# Patient Record
Sex: FEMALE | Race: WHITE | NOT HISPANIC OR LATINO
[De-identification: names, ages, dates, MRNs, and addresses within clinical notes are randomized per-mention and may not be internally consistent; named-entity substitution may affect disease eponyms.]

---

## 2017-05-18 ENCOUNTER — APPOINTMENT (OUTPATIENT)
Dept: OBGYN | Facility: CLINIC | Age: 32
End: 2017-05-18

## 2017-05-21 LAB
C TRACH RRNA SPEC QL NAA+PROBE: NORMAL
HPV HIGH+LOW RISK DNA PNL CVX: NEGATIVE
N GONORRHOEA RRNA SPEC QL NAA+PROBE: NORMAL
SOURCE TP AMPLIFICATION: NORMAL

## 2017-05-23 LAB — CYTOLOGY CVX/VAG DOC THIN PREP: NORMAL

## 2018-09-04 ENCOUNTER — LABORATORY RESULT (OUTPATIENT)
Age: 33
End: 2018-09-04

## 2018-09-05 ENCOUNTER — APPOINTMENT (OUTPATIENT)
Dept: OBGYN | Facility: CLINIC | Age: 33
End: 2018-09-05
Payer: COMMERCIAL

## 2018-09-05 VITALS
DIASTOLIC BLOOD PRESSURE: 78 MMHG | BODY MASS INDEX: 20.62 KG/M2 | HEIGHT: 60 IN | SYSTOLIC BLOOD PRESSURE: 115 MMHG | WEIGHT: 105 LBS

## 2018-09-05 PROCEDURE — 36415 COLL VENOUS BLD VENIPUNCTURE: CPT

## 2018-09-05 PROCEDURE — 99395 PREV VISIT EST AGE 18-39: CPT

## 2018-09-07 LAB
C TRACH RRNA SPEC QL NAA+PROBE: NOT DETECTED
HPV HIGH+LOW RISK DNA PNL CVX: NOT DETECTED
N GONORRHOEA RRNA SPEC QL NAA+PROBE: NOT DETECTED
SOURCE TP AMPLIFICATION: NORMAL
ZIKA PCR SERUM: NOT DETECTED

## 2018-09-11 LAB — CYTOLOGY CVX/VAG DOC THIN PREP: NORMAL

## 2018-09-21 ENCOUNTER — TRANSCRIPTION ENCOUNTER (OUTPATIENT)
Age: 33
End: 2018-09-21

## 2018-09-24 ENCOUNTER — OTHER (OUTPATIENT)
Age: 33
End: 2018-09-24

## 2019-01-01 ENCOUNTER — LABORATORY RESULT (OUTPATIENT)
Age: 34
End: 2019-01-01

## 2019-08-01 ENCOUNTER — APPOINTMENT (OUTPATIENT)
Dept: OBGYN | Facility: CLINIC | Age: 34
End: 2019-08-01
Payer: COMMERCIAL

## 2019-08-01 VITALS
SYSTOLIC BLOOD PRESSURE: 132 MMHG | BODY MASS INDEX: 21.6 KG/M2 | HEIGHT: 60 IN | DIASTOLIC BLOOD PRESSURE: 81 MMHG | WEIGHT: 110 LBS

## 2019-08-01 PROCEDURE — 99214 OFFICE O/P EST MOD 30 MIN: CPT

## 2019-08-01 RX ORDER — NORETHINDRONE ACETATE 5 MG/1
5 TABLET ORAL DAILY
Qty: 40 | Refills: 0 | Status: COMPLETED | COMMUNITY
Start: 2017-12-04 | End: 2019-08-01

## 2019-08-01 RX ORDER — OSELTAMIVIR PHOSPHATE 75 MG/1
75 CAPSULE ORAL
Qty: 10 | Refills: 0 | Status: COMPLETED | COMMUNITY
Start: 2019-02-16

## 2019-08-01 RX ORDER — FLUCONAZOLE 150 MG/1
150 TABLET ORAL DAILY
Qty: 1 | Refills: 0 | Status: COMPLETED | COMMUNITY
Start: 2017-12-21 | End: 2019-08-01

## 2019-08-02 LAB
CANDIDA VAG CYTO: DETECTED
G VAGINALIS+PREV SP MTYP VAG QL MICRO: NOT DETECTED
T VAGINALIS VAG QL WET PREP: NOT DETECTED

## 2019-08-05 ENCOUNTER — APPOINTMENT (OUTPATIENT)
Dept: HUMAN REPRODUCTION | Facility: CLINIC | Age: 34
End: 2019-08-05
Payer: COMMERCIAL

## 2019-08-05 PROCEDURE — 99205 OFFICE O/P NEW HI 60 MIN: CPT | Mod: 25

## 2019-08-05 PROCEDURE — 36415 COLL VENOUS BLD VENIPUNCTURE: CPT

## 2019-08-05 PROCEDURE — 76830 TRANSVAGINAL US NON-OB: CPT

## 2019-08-08 ENCOUNTER — APPOINTMENT (OUTPATIENT)
Dept: HUMAN REPRODUCTION | Facility: CLINIC | Age: 34
End: 2019-08-08

## 2019-08-14 ENCOUNTER — APPOINTMENT (OUTPATIENT)
Dept: RADIOLOGY | Facility: HOSPITAL | Age: 34
End: 2019-08-14
Payer: COMMERCIAL

## 2019-08-14 ENCOUNTER — OUTPATIENT (OUTPATIENT)
Dept: OUTPATIENT SERVICES | Facility: HOSPITAL | Age: 34
LOS: 1 days | End: 2019-08-14
Payer: COMMERCIAL

## 2019-08-14 DIAGNOSIS — Z00.00 ENCOUNTER FOR GENERAL ADULT MEDICAL EXAMINATION WITHOUT ABNORMAL FINDINGS: ICD-10-CM

## 2019-08-14 DIAGNOSIS — N97.1 FEMALE INFERTILITY OF TUBAL ORIGIN: ICD-10-CM

## 2019-08-14 PROCEDURE — 58340 CATHETER FOR HYSTEROGRAPHY: CPT

## 2019-08-14 PROCEDURE — 74740 X-RAY FEMALE GENITAL TRACT: CPT

## 2019-08-14 PROCEDURE — 74740 X-RAY FEMALE GENITAL TRACT: CPT | Mod: 26

## 2019-08-16 ENCOUNTER — APPOINTMENT (OUTPATIENT)
Dept: HUMAN REPRODUCTION | Facility: CLINIC | Age: 34
End: 2019-08-16
Payer: COMMERCIAL

## 2019-08-16 PROCEDURE — 36415 COLL VENOUS BLD VENIPUNCTURE: CPT

## 2019-08-30 ENCOUNTER — APPOINTMENT (OUTPATIENT)
Dept: HUMAN REPRODUCTION | Facility: CLINIC | Age: 34
End: 2019-08-30
Payer: COMMERCIAL

## 2019-08-30 PROCEDURE — 99215 OFFICE O/P EST HI 40 MIN: CPT

## 2019-09-04 ENCOUNTER — OTHER (OUTPATIENT)
Age: 34
End: 2019-09-04

## 2019-09-05 ENCOUNTER — APPOINTMENT (OUTPATIENT)
Dept: MRI IMAGING | Facility: CLINIC | Age: 34
End: 2019-09-05

## 2019-09-09 ENCOUNTER — APPOINTMENT (OUTPATIENT)
Dept: ANTEPARTUM | Facility: CLINIC | Age: 34
End: 2019-09-09
Payer: COMMERCIAL

## 2019-09-09 ENCOUNTER — APPOINTMENT (OUTPATIENT)
Dept: MATERNAL FETAL MEDICINE | Facility: CLINIC | Age: 34
End: 2019-09-09
Payer: COMMERCIAL

## 2019-09-09 ENCOUNTER — ASOB RESULT (OUTPATIENT)
Age: 34
End: 2019-09-09

## 2019-09-09 VITALS
HEIGHT: 60 IN | DIASTOLIC BLOOD PRESSURE: 60 MMHG | HEART RATE: 98 BPM | BODY MASS INDEX: 21.6 KG/M2 | SYSTOLIC BLOOD PRESSURE: 122 MMHG | WEIGHT: 110 LBS

## 2019-09-09 DIAGNOSIS — O09.299 SUPERVISION OF PREGNANCY WITH OTHER POOR REPRODUCTIVE OR OBSTETRIC HISTORY, UNSPECIFIED TRIMESTER: ICD-10-CM

## 2019-09-09 DIAGNOSIS — Z01.419 ENCOUNTER FOR GYNECOLOGICAL EXAMINATION (GENERAL) (ROUTINE) W/OUT ABNORMAL FINDINGS: ICD-10-CM

## 2019-09-09 DIAGNOSIS — O09.291 SUPERVISION OF PREGNANCY WITH OTHER POOR REPRODUCTIVE OR OBSTETRIC HISTORY, FIRST TRIMESTER: ICD-10-CM

## 2019-09-09 DIAGNOSIS — B37.3 CANDIDIASIS OF VULVA AND VAGINA: ICD-10-CM

## 2019-09-09 DIAGNOSIS — Z98.891 HISTORY OF UTERINE SCAR FROM PREVIOUS SURGERY: ICD-10-CM

## 2019-09-09 DIAGNOSIS — Z87.51 PERSONAL HISTORY OF PRE-TERM LABOR: ICD-10-CM

## 2019-09-09 DIAGNOSIS — Z78.9 OTHER SPECIFIED HEALTH STATUS: ICD-10-CM

## 2019-09-09 DIAGNOSIS — Z87.59 PERSONAL HISTORY OF OTHER COMPLICATIONS OF PREGNANCY, CHILDBIRTH AND THE PUERPERIUM: ICD-10-CM

## 2019-09-09 DIAGNOSIS — Z87.42 PERSONAL HISTORY OF OTHER DISEASES OF THE FEMALE GENITAL TRACT: ICD-10-CM

## 2019-09-09 DIAGNOSIS — O26.20 PREGNANCY CARE FOR PATIENT WITH RECURRENT PREGNANCY LOSS, UNSPECIFIED TRIMESTER: ICD-10-CM

## 2019-09-09 DIAGNOSIS — Z31.69 ENCOUNTER FOR OTHER GENERAL COUNSELING AND ADVICE ON PROCREATION: ICD-10-CM

## 2019-09-09 PROCEDURE — 76817 TRANSVAGINAL US OBSTETRIC: CPT

## 2019-09-09 PROCEDURE — 99205 OFFICE O/P NEW HI 60 MIN: CPT | Mod: 25,TH

## 2019-09-09 RX ORDER — FLUCONAZOLE 150 MG/1
150 TABLET ORAL
Qty: 1 | Refills: 1 | Status: DISCONTINUED | COMMUNITY
Start: 2019-08-02 | End: 2019-09-09

## 2019-09-09 RX ORDER — TERCONAZOLE 8 MG/G
0.8 CREAM VAGINAL
Qty: 1 | Refills: 0 | Status: DISCONTINUED | COMMUNITY
Start: 2019-08-05 | End: 2019-09-09

## 2019-09-09 RX ORDER — MULTIVIT-MIN/FOLIC/VIT K/LYCOP 400-300MCG
28-0.8 TABLET ORAL DAILY
Qty: 30 | Refills: 3 | Status: ACTIVE | COMMUNITY
Start: 2019-09-09

## 2019-09-09 RX ORDER — DOXYCYCLINE HYCLATE 100 MG/1
100 TABLET ORAL
Qty: 8 | Refills: 0 | Status: DISCONTINUED | COMMUNITY
Start: 2019-08-05 | End: 2019-09-09

## 2019-09-09 NOTE — DISCUSSION/SUMMARY
[FreeTextEntry1] : Alex is a 33-year-old  being seen at approximately 5 weeks for previous history of incompetent cervix with  loss. Her obstetrical history is significant for 2 first trimester miscarriages one at 8 weeks one at 12 weeks both requiring D&C. Subsequent pregnancy in  an incompetent cervix was diagnosed at approximately 20 weeks. Rescue cerclage was placed and the patient was put on bed rest. Approximately 2 weeks later patient had cramps and abdominal pain and was evaluated. Spontaneous rupture membranes occurred and subsequently Alex delivered vaginally without problems or complications. Pathology was consistent with chorioamnionitis. Of note the patient was told that she had a septum after the last pregnancy loss.  An operative hysteroscopy has been performed to remove the septum. She is being followed by Dr. Verdugo and a saline hysterogram was performed recently and a possible uterine septum was again noted.\par \par A first trimester ultrasound was performed today which does reveal a single intrauterine gestational sac with probably yolk sac seen. Fetal pole and fetal cardiac activity was not noted on today's study. Size is consistent with dates. Vital signs today reveal a blood pressure of 122/60 and maternal weight is 110 pounds which is a 0 pound weight gain from the evaluation done on . This is consistent with a BMI of 21.48KG.\par \par Management of the pregnancy was discussed. The patient was advised that after NIPS blood work and ultrascreen evaluation performed and found to be within normal limits a prophylactic cerclage should be placed between 13 and 14 weeks. In addition at the time of the next ultrasound we will attempt to evaluate if a uterine septum is present and how it may impact this pregnancy. After normal genetic testing and the cerclages placed between 13 and 14 weeks a cervical length evaluations will be done at 16 weeks, 20 weeks and 23 weeks. No other changes in her current medical management are indicated. As it pertains to her advanced maternal age genetic counseling will be performed and this will be scheduled after the next ultrasound. All of the above was discussed with the patient and her  and all their questions were answered.\par \par Her family and medical histories are noncontributory. Her surgical history is significant for 2 D&Cs, operative hysteroscopy and a cervical cerclage. She has no known allergies to medications and denies alcohol, tobacco or drug use.\par \par \par \par Recommendations;\par \par #1. Followup ultrasound in 2 weeks for viability and dating.\par #2. Genetic counseling with NIPS blood work to be performed.\par #3. Ultrascreen evaluation in between 11 and 12 weeks.\par #4. Cervical cerclage placed prophylactically between 13 and 14 week should all genetic testing demonstrate low risk for fetal aneuploidy.\par #5. Followup maternal fetal medicine consultation after cerclage placed.\par \par 60 minutes of face to face time for this consultation.

## 2019-09-23 ENCOUNTER — ASOB RESULT (OUTPATIENT)
Age: 34
End: 2019-09-23

## 2019-09-23 ENCOUNTER — APPOINTMENT (OUTPATIENT)
Dept: ANTEPARTUM | Facility: CLINIC | Age: 34
End: 2019-09-23
Payer: COMMERCIAL

## 2019-09-23 PROCEDURE — 76817 TRANSVAGINAL US OBSTETRIC: CPT

## 2019-09-26 ENCOUNTER — APPOINTMENT (OUTPATIENT)
Dept: OBGYN | Facility: CLINIC | Age: 34
End: 2019-09-26
Payer: COMMERCIAL

## 2019-09-26 VITALS
BODY MASS INDEX: 21.6 KG/M2 | DIASTOLIC BLOOD PRESSURE: 78 MMHG | HEIGHT: 60 IN | SYSTOLIC BLOOD PRESSURE: 119 MMHG | WEIGHT: 110 LBS

## 2019-09-26 DIAGNOSIS — Z00.00 ENCOUNTER FOR GENERAL ADULT MEDICAL EXAMINATION W/OUT ABNORMAL FINDINGS: ICD-10-CM

## 2019-09-26 PROCEDURE — 99395 PREV VISIT EST AGE 18-39: CPT

## 2019-09-26 PROCEDURE — 36415 COLL VENOUS BLD VENIPUNCTURE: CPT

## 2019-09-26 NOTE — HISTORY OF PRESENT ILLNESS
[1 Year Ago] : 1 year ago [Good] : being in good health [Healthy Diet] : a healthy diet [Regular Exercise] : regular exercise [Up to Date] : up to date with ~his/her~ STD screening [Sexually Active] : is sexually active [Weight Concerns] : no concerns with her weight [Menstrual Problems] : reports normal menses [Fever] : no fever [Nausea] : no nausea [Vomiting] : no vomiting [Diarrhea] : no diarrhea [Vaginal Bleeding] : no vaginal bleeding [Pelvic Pressure] : no pelvic pressure [Dysuria] : no dysuria

## 2019-09-26 NOTE — REVIEW OF SYSTEMS
----- Message from Loida Sinhg sent at 4/9/2019  7:59 AM CDT -----  Contact: SABAS  Pt was a no show for her MFM appt.    [Nl] : Integumentary

## 2019-09-27 LAB
ABO + RH PNL BLD: NORMAL
ALBUMIN SERPL ELPH-MCNC: 4.7 G/DL
ALP BLD-CCNC: 55 U/L
ALT SERPL-CCNC: 14 U/L
ANION GAP SERPL CALC-SCNC: 13 MMOL/L
AST SERPL-CCNC: 13 U/L
BASOPHILS # BLD AUTO: 0.02 K/UL
BASOPHILS NFR BLD AUTO: 0.2 %
BILIRUB SERPL-MCNC: 0.2 MG/DL
BLD GP AB SCN SERPL QL: NORMAL
BUN SERPL-MCNC: 11 MG/DL
CALCIUM SERPL-MCNC: 9.4 MG/DL
CHLORIDE SERPL-SCNC: 100 MMOL/L
CO2 SERPL-SCNC: 24 MMOL/L
CREAT SERPL-MCNC: 0.49 MG/DL
EOSINOPHIL # BLD AUTO: 0.19 K/UL
EOSINOPHIL NFR BLD AUTO: 2.2 %
GLUCOSE SERPL-MCNC: 117 MG/DL
HBV SURFACE AG SER QL: NONREACTIVE
HCT VFR BLD CALC: 40.5 %
HCV AB SER QL: NONREACTIVE
HCV S/CO RATIO: 0.15 S/CO
HGB BLD-MCNC: 13 G/DL
HIV1+2 AB SPEC QL IA.RAPID: NONREACTIVE
HPV HIGH+LOW RISK DNA PNL CVX: NOT DETECTED
IMM GRANULOCYTES NFR BLD AUTO: 0.2 %
LYMPHOCYTES # BLD AUTO: 1.85 K/UL
LYMPHOCYTES NFR BLD AUTO: 21.3 %
MAN DIFF?: NORMAL
MCHC RBC-ENTMCNC: 29.5 PG
MCHC RBC-ENTMCNC: 32.1 GM/DL
MCV RBC AUTO: 92 FL
MEV IGG FLD QL IA: >300 AU/ML
MEV IGG+IGM SER-IMP: POSITIVE
MONOCYTES # BLD AUTO: 0.81 K/UL
MONOCYTES NFR BLD AUTO: 9.3 %
NEUTROPHILS # BLD AUTO: 5.79 K/UL
NEUTROPHILS NFR BLD AUTO: 66.8 %
PLATELET # BLD AUTO: 199 K/UL
POTASSIUM SERPL-SCNC: 3.6 MMOL/L
PROT SERPL-MCNC: 7.1 G/DL
RBC # BLD: 4.4 M/UL
RBC # FLD: 12.2 %
RUBV IGG FLD-ACNC: 3.6 INDEX
RUBV IGG SER-IMP: POSITIVE
SODIUM SERPL-SCNC: 137 MMOL/L
T PALLIDUM AB SER QL IA: NEGATIVE
TSH SERPL-ACNC: 1.52 UIU/ML
VZV AB TITR SER: POSITIVE
VZV IGG SER IF-ACNC: 1378 INDEX
WBC # FLD AUTO: 8.68 K/UL

## 2019-09-29 LAB
LEAD BLD-MCNC: <1 UG/DL
MEV IGM SER QL: NEGATIVE

## 2019-10-02 LAB
B19V IGG SER QL IA: 5.6 INDEX
B19V IGG+IGM SER-IMP: NORMAL
B19V IGG+IGM SER-IMP: POSITIVE
B19V IGM FLD-ACNC: 0.1
B19V IGM SER-ACNC: NEGATIVE
CYTOLOGY CVX/VAG DOC THIN PREP: NORMAL
FMR1 GENE MUT ANL BLD/T: NORMAL
HGB A MFR BLD: 97.4 %
HGB A2 MFR BLD: 2.6 %
HGB FRACT BLD-IMP: NORMAL

## 2019-10-04 LAB
AR GENE MUT ANL BLD/T: NEGATIVE
CFTR MUT TESTED BLD/T: NEGATIVE

## 2019-10-08 ENCOUNTER — APPOINTMENT (OUTPATIENT)
Dept: OBGYN | Facility: CLINIC | Age: 34
End: 2019-10-08

## 2019-10-08 ENCOUNTER — OTHER (OUTPATIENT)
Age: 34
End: 2019-10-08

## 2019-10-11 ENCOUNTER — APPOINTMENT (OUTPATIENT)
Dept: OBGYN | Facility: CLINIC | Age: 34
End: 2019-10-11
Payer: COMMERCIAL

## 2019-10-11 PROCEDURE — 36415 COLL VENOUS BLD VENIPUNCTURE: CPT

## 2019-10-21 ENCOUNTER — APPOINTMENT (OUTPATIENT)
Dept: MATERNAL FETAL MEDICINE | Facility: CLINIC | Age: 34
End: 2019-10-21
Payer: COMMERCIAL

## 2019-10-21 PROCEDURE — ZZZZZ: CPT

## 2019-10-21 PROCEDURE — 99213 OFFICE O/P EST LOW 20 MIN: CPT

## 2019-10-29 ENCOUNTER — APPOINTMENT (OUTPATIENT)
Dept: MATERNAL FETAL MEDICINE | Facility: CLINIC | Age: 34
End: 2019-10-29

## 2019-10-29 ENCOUNTER — APPOINTMENT (OUTPATIENT)
Dept: ANTEPARTUM | Facility: CLINIC | Age: 34
End: 2019-10-29

## 2019-10-30 ENCOUNTER — APPOINTMENT (OUTPATIENT)
Dept: OBGYN | Facility: CLINIC | Age: 34
End: 2019-10-30
Payer: COMMERCIAL

## 2019-10-30 VITALS — WEIGHT: 116 LBS | BODY MASS INDEX: 22.66 KG/M2 | SYSTOLIC BLOOD PRESSURE: 121 MMHG | DIASTOLIC BLOOD PRESSURE: 80 MMHG

## 2019-10-30 PROCEDURE — 0502F SUBSEQUENT PRENATAL CARE: CPT

## 2019-10-31 ENCOUNTER — NON-APPOINTMENT (OUTPATIENT)
Age: 34
End: 2019-10-31

## 2019-11-02 ENCOUNTER — APPOINTMENT (OUTPATIENT)
Dept: ANTEPARTUM | Facility: CLINIC | Age: 34
End: 2019-11-02
Payer: COMMERCIAL

## 2019-11-02 PROCEDURE — 76813 OB US NUCHAL MEAS 1 GEST: CPT

## 2019-11-02 PROCEDURE — 76801 OB US < 14 WKS SINGLE FETUS: CPT

## 2019-11-06 ENCOUNTER — OUTPATIENT (OUTPATIENT)
Dept: OUTPATIENT SERVICES | Facility: HOSPITAL | Age: 34
LOS: 1 days | End: 2019-11-06

## 2019-11-06 VITALS
WEIGHT: 115.96 LBS | SYSTOLIC BLOOD PRESSURE: 100 MMHG | RESPIRATION RATE: 16 BRPM | OXYGEN SATURATION: 99 % | TEMPERATURE: 99 F | HEART RATE: 98 BPM | DIASTOLIC BLOOD PRESSURE: 70 MMHG | HEIGHT: 60 IN

## 2019-11-06 DIAGNOSIS — Z98.890 OTHER SPECIFIED POSTPROCEDURAL STATES: Chronic | ICD-10-CM

## 2019-11-06 DIAGNOSIS — O34.30 MATERNAL CARE FOR CERVICAL INCOMPETENCE, UNSPECIFIED TRIMESTER: ICD-10-CM

## 2019-11-06 PROBLEM — Z00.00 ENCOUNTER FOR PREVENTIVE HEALTH EXAMINATION: Noted: 2019-11-06

## 2019-11-06 LAB
BLD GP AB SCN SERPL QL: NEGATIVE — SIGNIFICANT CHANGE UP
HCT VFR BLD CALC: 38.8 % — SIGNIFICANT CHANGE UP (ref 34.5–45)
HGB BLD-MCNC: 12.4 G/DL — SIGNIFICANT CHANGE UP (ref 11.5–15.5)
MCHC RBC-ENTMCNC: 29.8 PG — SIGNIFICANT CHANGE UP (ref 27–34)
MCHC RBC-ENTMCNC: 32 % — SIGNIFICANT CHANGE UP (ref 32–36)
MCV RBC AUTO: 93.3 FL — SIGNIFICANT CHANGE UP (ref 80–100)
NRBC # FLD: 0 K/UL — SIGNIFICANT CHANGE UP (ref 0–0)
PLATELET # BLD AUTO: 167 K/UL — SIGNIFICANT CHANGE UP (ref 150–400)
PMV BLD: 10 FL — SIGNIFICANT CHANGE UP (ref 7–13)
RBC # BLD: 4.16 M/UL — SIGNIFICANT CHANGE UP (ref 3.8–5.2)
RBC # FLD: 12.5 % — SIGNIFICANT CHANGE UP (ref 10.3–14.5)
RH IG SCN BLD-IMP: POSITIVE — SIGNIFICANT CHANGE UP
WBC # BLD: 8.12 K/UL — SIGNIFICANT CHANGE UP (ref 3.8–10.5)
WBC # FLD AUTO: 8.12 K/UL — SIGNIFICANT CHANGE UP (ref 3.8–10.5)

## 2019-11-06 NOTE — H&P PST ADULT - NSICDXPASTSURGICALHX_GEN_ALL_CORE_FT
PAST SURGICAL HISTORY:  History of cervical cerclage done emergently 2014    S/P dilation and curettage x2

## 2019-11-06 NOTE — H&P PST ADULT - HISTORY OF PRESENT ILLNESS
34 year old female, 13 weeks pregnant with hx of 3 miscarriages, last on in 2014 at 22 weeks gestation. Pt presents today for presurgical evaluation for 34 year old female, 13 weeks pregnant with hx of 3 miscarriages, last on in 2014 at 22 weeks gestation. Pt presents today for presurgical evaluation for Cerclage Insertion scheduled on 11/11/19.

## 2019-11-06 NOTE — H&P PST ADULT - NSICDXPROBLEM_GEN_ALL_CORE_FT
PROBLEM DIAGNOSES  Problem: Maternal care for cervical incompetence  Assessment and Plan: Pt scheduled for surgery on 11/11/19.  Pre-op instructions provided. Pt verbalized understanding.

## 2019-11-06 NOTE — H&P PST ADULT - MUSCULOSKELETAL
details… detailed exam no joint swelling/ROM intact/no joint erythema/normal strength/no joint warmth/no calf tenderness

## 2019-11-06 NOTE — H&P PST ADULT - NS MD HP INPLANTS MED DEV
CHIEF COMPLAINT: This is a 36-year-old female here for preventive health exam.    SUBJECTIVE: Patient's doing well without complaints.  She has borderline SAMIRA and has not been using CPAP.  Patient admits that she does not sleep more than 4-6 hours a night, however.  She requests refill of Ambien.  She is working full-time and obtaining her Master's degree online.  She's gained 8 pounds in the last year.  Her menstrual cycles are regular, often with heavy flow and dysmenorrhea.  Patient has uterine fibroid.  She takes pseudoephedrine and Zyrtec for allergic rhinitis.     Eye exam 2016. Pap smear February 2017, due again in February 2020. Tdap September 2012. Flu vaccine October 2016.     ROS:  GENERAL: Patient denies fever, chills, night sweats. Patient denies weight gain. Patient denies anorexia, fatigue, weakness or swollen glands.  SKIN: Patient denies rash or hair loss.  HEENT: Patient denies sore throat, ear pain, hearing loss, nasal congestion, or runny nose. Patient denies visual disturbance, eye irritation or discharge.  LUNGS: Patient denies cough, wheeze or hemoptysis.  CARDIOVASCULAR: Patient denies chest pain, shortness of breath, palpitations, syncope or lower extremity edema.  GI: Patient denies abdominal pain, nausea, vomiting, diarrhea, constipation, blood in stool or melena.  GENITOURINARY: Patient denies pelvic pain, vaginal discharge, itch or odor. Patient denies irregular vaginal bleeding. Patient denies dysuria, frequency, hematuria, nocturia, urgency or incontinence.  BREASTS: Patient denies breast pain, mass or nipple discharge.  MUSCULOSKELETAL: Patient denies joint pain, swelling, redness or warmth.  NEUROLOGIC: Patient denies headache, vertigo, paresthesias, weakness in limb, dysarthria, dysphagia or abnormality of gait.  PSYCHIATRIC: Patient denies anxiety, depression, or memory loss. Positive for insomnia.     OBJECTIVE:   GENERAL: Well-developed well-nourished black female alert and  oriented x3, in no acute distress. Memory, judgment and cognition without deficit. Weight loss of 17 pounds in the last year.  SKIN: Clear without rash. Normal color and tone.  HEENT: Eyes: Clear conjunctivae. Pupils equal reactive to light and accommodation. Ears: Clear TMs. Clear canals. Nose: Without congestion. Pharynx: Without injection or exudates.  NECK: Supple, normal range of motion. No masses, lymphadenopathy or enlarged thyroid. No JVD. Carotids 2+ and equal. No bruits.  LUNGS: Clear to auscultation. Normal respiratory effort.  CARDIOVASCULAR: Regular rhythm, normal S1, S2 without murmur, gallop or rub.  BACK: No CVA or spinal tenderness.  BREASTS: No masses, tenderness or nipple discharge.  ABDOMEN: Normal appearance. Active bowel sounds. Soft, nontender without mass or organomegaly. No rebound or guarding.  EXTREMITIES: Without cyanosis, clubbing or edema. Distal pulses 2+ and equal. Normal range of motion in all extremities. No joint effusion, erythema or warmth.  NEUROLOGIC: Cranial nerves II through XII without deficit. Motor strength equal bilaterally. Sensation normal to touch. Deep tendon reflexes 2+ and equal. Gait without abnormality. No tremor. Negative cerebellar signs.   PELVIC: External: Without lesions or inflammation.  Vaginal: Clear.  Cervix: Small nabothian cyst, nontender.  Pap X2.  Uterus: Enlarged and retroflexed.  Adnexa: Non-tender, without masses.      ASSESSMENT:  1. Preventative health care    2. Abnormal Papanicolaou smear of cervix with positive human papilloma virus (HPV) test    3. Chronic allergic rhinitis, unspecified seasonality, unspecified trigger    4. Primary insomnia      PLAN:   1.  Exercise regularly.  2.  Age-appropriate counseling.  3.  Fasting lab.  4.  Patient declines influenza vaccine.  5.  Refill Ambien.  6.  Follow-up annually.   None

## 2019-11-07 ENCOUNTER — APPOINTMENT (OUTPATIENT)
Dept: ANTEPARTUM | Facility: CLINIC | Age: 34
End: 2019-11-07
Payer: COMMERCIAL

## 2019-11-07 PROCEDURE — 76817 TRANSVAGINAL US OBSTETRIC: CPT

## 2019-11-09 ENCOUNTER — APPOINTMENT (OUTPATIENT)
Dept: ANTEPARTUM | Facility: CLINIC | Age: 34
End: 2019-11-09
Payer: COMMERCIAL

## 2019-11-09 PROCEDURE — 76817 TRANSVAGINAL US OBSTETRIC: CPT

## 2019-11-10 ENCOUNTER — TRANSCRIPTION ENCOUNTER (OUTPATIENT)
Age: 34
End: 2019-11-10

## 2019-11-11 ENCOUNTER — OUTPATIENT (OUTPATIENT)
Dept: OUTPATIENT SERVICES | Facility: HOSPITAL | Age: 34
LOS: 1 days | Discharge: HOME | End: 2019-11-11

## 2019-11-11 ENCOUNTER — TRANSCRIPTION ENCOUNTER (OUTPATIENT)
Age: 34
End: 2019-11-11

## 2019-11-11 VITALS — DIASTOLIC BLOOD PRESSURE: 74 MMHG | SYSTOLIC BLOOD PRESSURE: 110 MMHG | HEART RATE: 100 BPM

## 2019-11-11 VITALS
HEART RATE: 97 BPM | RESPIRATION RATE: 17 BRPM | OXYGEN SATURATION: 99 % | DIASTOLIC BLOOD PRESSURE: 84 MMHG | SYSTOLIC BLOOD PRESSURE: 105 MMHG

## 2019-11-11 DIAGNOSIS — Z98.890 OTHER SPECIFIED POSTPROCEDURAL STATES: Chronic | ICD-10-CM

## 2019-11-11 DIAGNOSIS — O34.30 MATERNAL CARE FOR CERVICAL INCOMPETENCE, UNSPECIFIED TRIMESTER: ICD-10-CM

## 2019-11-11 LAB
BLD GP AB SCN SERPL QL: NEGATIVE — SIGNIFICANT CHANGE UP
RH IG SCN BLD-IMP: POSITIVE — SIGNIFICANT CHANGE UP

## 2019-11-11 RX ORDER — INDOMETHACIN 50 MG
1 CAPSULE ORAL
Qty: 8 | Refills: 0
Start: 2019-11-11 | End: 2019-11-12

## 2019-11-11 RX ORDER — ACETAMINOPHEN 500 MG
1000 TABLET ORAL ONCE
Refills: 0 | Status: COMPLETED | OUTPATIENT
Start: 2019-11-11 | End: 2019-11-11

## 2019-11-11 RX ORDER — SODIUM CHLORIDE 9 MG/ML
1000 INJECTION, SOLUTION INTRAVENOUS ONCE
Refills: 0 | Status: COMPLETED | OUTPATIENT
Start: 2019-11-11 | End: 2019-11-11

## 2019-11-11 RX ORDER — SODIUM CHLORIDE 9 MG/ML
1000 INJECTION, SOLUTION INTRAVENOUS
Refills: 0 | Status: DISCONTINUED | OUTPATIENT
Start: 2019-11-11 | End: 2019-11-11

## 2019-11-11 RX ORDER — METOCLOPRAMIDE HCL 10 MG
10 TABLET ORAL ONCE
Refills: 0 | Status: COMPLETED | OUTPATIENT
Start: 2019-11-11 | End: 2019-11-11

## 2019-11-11 RX ORDER — INDOMETHACIN 50 MG
25 CAPSULE ORAL EVERY 6 HOURS
Refills: 0 | Status: DISCONTINUED | OUTPATIENT
Start: 2019-11-11 | End: 2019-11-11

## 2019-11-11 RX ORDER — CITRIC ACID/SODIUM CITRATE 300-500 MG
30 SOLUTION, ORAL ORAL ONCE
Refills: 0 | Status: COMPLETED | OUTPATIENT
Start: 2019-11-11 | End: 2019-11-11

## 2019-11-11 RX ORDER — FAMOTIDINE 10 MG/ML
20 INJECTION INTRAVENOUS ONCE
Refills: 0 | Status: COMPLETED | OUTPATIENT
Start: 2019-11-11 | End: 2019-11-11

## 2019-11-11 RX ORDER — INDOMETHACIN 50 MG
50 CAPSULE ORAL ONCE
Refills: 0 | Status: COMPLETED | OUTPATIENT
Start: 2019-11-11 | End: 2019-11-11

## 2019-11-11 RX ADMIN — SODIUM CHLORIDE 125 MILLILITER(S): 9 INJECTION, SOLUTION INTRAVENOUS at 13:32

## 2019-11-11 RX ADMIN — SODIUM CHLORIDE 2000 MILLILITER(S): 9 INJECTION, SOLUTION INTRAVENOUS at 07:13

## 2019-11-11 RX ADMIN — FAMOTIDINE 20 MILLIGRAM(S): 10 INJECTION INTRAVENOUS at 07:12

## 2019-11-11 RX ADMIN — Medication 50 MILLIGRAM(S): at 07:18

## 2019-11-11 RX ADMIN — Medication 30 MILLILITER(S): at 07:13

## 2019-11-11 RX ADMIN — SODIUM CHLORIDE 200 MILLILITER(S): 9 INJECTION, SOLUTION INTRAVENOUS at 07:13

## 2019-11-11 RX ADMIN — Medication 25 MILLIGRAM(S): at 13:29

## 2019-11-11 RX ADMIN — Medication 400 MILLIGRAM(S): at 08:51

## 2019-11-11 RX ADMIN — Medication 10 MILLIGRAM(S): at 07:12

## 2019-11-11 NOTE — OB PROVIDER H&P - NSHPPHYSICALEXAM_GEN_ALL_CORE
Gen: wdwn female, A&Ox3, NAD,   Chest: s1s2 + RRR, no w/r/r  abd: gravid,  ext: no edema noted b/l lower extremities    5'0"  115lbs/52kg  BMI : 22.5 Gen: wdwn female, A&Ox3, NAD,   Chest: s1s2 + RRR, no w/r/r  abd: gravid,  ext: no edema noted b/l lower extremities    Sono: confirmed fetal heart check performed this am by myself  placenta anterior and not covering cervix    5'0"  115lbs/52kg  BMI : 22.5

## 2019-11-11 NOTE — DISCHARGE NOTE ANTEPARTUM - HOSPITAL COURSE
Patient admitted at 13w5d admitted for placement of Rain cerclage for cervical insufficiency. Cerclage placed without complications and FH confirmed postoperatively.

## 2019-11-11 NOTE — DISCHARGE NOTE ANTEPARTUM - CARE PLAN
Principal Discharge DX:	Cervical insufficiency during pregnancy, antepartum  Goal:	return to prenatal course of care  Assessment and plan of treatment:	-Continue with indomethacin as prescribed for 48h   -Call your OBGYN w/ any vaginal bleeding, contractions, leakage of fluid.   -Follow up with your OBGYN as scheduled

## 2019-11-11 NOTE — OB PROVIDER H&P - NS_OBGYNHISTORY_OBGYN_ALL_OB_FT
2013 @22w vaginal delivery 2/2 failed cerclage. At 20w, pt was dilated and emerg. cerclage placed, failed at 22w.  Vaginal delivery of female.   both <12w D&C    gyn: denies abn pap/std/hpv    GBS: unknown at this time

## 2019-11-11 NOTE — DISCHARGE NOTE ANTEPARTUM - PLAN OF CARE
return to prenatal course of care -Continue with indomethacin as prescribed for 48h   -Call your OBGYN w/ any vaginal bleeding, contractions, leakage of fluid.   -Follow up with your OBGYN as scheduled

## 2019-11-11 NOTE — BRIEF OPERATIVE NOTE - NSICDXBRIEFPOSTOP_GEN_ALL_CORE_FT
POST-OP DIAGNOSIS:  Cervical insufficiency in pregnancy, antepartum 11-Nov-2019 11:50:20  Heidy John

## 2019-11-11 NOTE — OB PROVIDER H&P - NSHPLABSRESULTS_GEN_ALL_CORE
Type + Screen (11.06.19 @ 18:46)    ABO Interpretation: B    Rh Interpretation: Positive    Antibody Screen: Negative    Complete Blood Count (11.06.19 @ 19:00)    WBC Count: 8.12 K/uL    Hemoglobin: 12.4 g/dL    Hematocrit: 38.8 %    Platelet Count - Automated: 167 K/uL

## 2019-11-11 NOTE — BRIEF OPERATIVE NOTE - OPERATION/FINDINGS
Rain cerclage placed with TVUS demonstrating closed cervix and abd sono demonstrating FHR 158bpm postprocedure.

## 2019-11-11 NOTE — DISCHARGE NOTE ANTEPARTUM - PATIENT PORTAL LINK FT
You can access the FollowMyHealth Patient Portal offered by [facility] by registering at the following website: [email or web address]. By joining [email or web address]’s FollowMyHealth portal, you will also be able to view your health information using other applications (apps) compatible with our system.

## 2019-11-11 NOTE — DISCHARGE NOTE ANTEPARTUM - MEDICATION SUMMARY - MEDICATIONS TO TAKE
I will START or STAY ON the medications listed below when I get home from the hospital:    indomethacin 25 mg oral capsule  -- 1 cap(s) by mouth every 6 hours MDD:4 for 48 hours  -- Do not take aspirin or aspirin containing products without knowledge and consent of your physician.  It is very important that you take or use this exactly as directed.  Do not skip doses or discontinue unless directed by your doctor.  May cause drowsiness or dizziness.  Obtain medical advice before taking any non-prescription drugs as some may affect the action of this medication.  Take with food or milk.    -- Indication: For s/p cerclage    Prenatal 1 oral capsule  -- 1 cap(s) by mouth once a day  -- Indication: For prenatal care

## 2019-11-11 NOTE — OB PROVIDER H&P - HISTORY OF PRESENT ILLNESS
This 33 yo  female  at 13w5d presents for cerclage placement.  Pt denies any vaginal bleeding or LOF.  Dr. Jessica Llanes is her primary OB and pt has been seeing  since beginning of pregnancy.

## 2019-11-11 NOTE — OB PROVIDER H&P - ASSESSMENT
33 yo  Female  at 13w5d presents for scheduled cerclage placement    Admit to L&D  Routine labs/nst/ivf @200cc/hr  Pepcid, Reglan, Bicitra  prenatal record summary available  prenatal work sheet reviewed  sono to confirm fetal heart  pt willing to accept blood if needed, consents obtained  Dr. Ibrahim informed  Anesthesia consult for pain management    [facility], PAC  #[id number]

## 2019-11-11 NOTE — BRIEF OPERATIVE NOTE - NSICDXBRIEFPROCEDURE_GEN_ALL_CORE_FT
PROCEDURES:  Koffi cerclage of cervix during pregnancy by vaginal approach 11-Nov-2019 11:49:54  Heidy John

## 2019-11-16 NOTE — LETTER GREETING
Error     [I had the pleasure of seeing your patient today.] : I had the pleasure of seeing your patient today [Dear  ___] : Dear  [unfilled],

## 2019-11-18 ENCOUNTER — APPOINTMENT (OUTPATIENT)
Dept: ANTEPARTUM | Facility: CLINIC | Age: 34
End: 2019-11-18
Payer: COMMERCIAL

## 2019-11-18 PROCEDURE — 76817 TRANSVAGINAL US OBSTETRIC: CPT

## 2019-11-18 PROCEDURE — 76815 OB US LIMITED FETUS(S): CPT

## 2019-11-26 ENCOUNTER — NON-APPOINTMENT (OUTPATIENT)
Age: 34
End: 2019-11-26

## 2019-11-26 ENCOUNTER — APPOINTMENT (OUTPATIENT)
Dept: OBGYN | Facility: CLINIC | Age: 34
End: 2019-11-26
Payer: COMMERCIAL

## 2019-11-26 VITALS
HEIGHT: 60 IN | SYSTOLIC BLOOD PRESSURE: 130 MMHG | WEIGHT: 120 LBS | DIASTOLIC BLOOD PRESSURE: 84 MMHG | BODY MASS INDEX: 23.56 KG/M2

## 2019-11-26 PROCEDURE — 0502F SUBSEQUENT PRENATAL CARE: CPT

## 2019-12-02 ENCOUNTER — APPOINTMENT (OUTPATIENT)
Dept: ANTEPARTUM | Facility: CLINIC | Age: 34
End: 2019-12-02
Payer: COMMERCIAL

## 2019-12-02 PROCEDURE — 76817 TRANSVAGINAL US OBSTETRIC: CPT

## 2019-12-10 RX ORDER — HYDROXYPROGESTERONE CAPROATE 250 MG/ML
250 INJECTION INTRAMUSCULAR
Qty: 1 | Refills: 5 | Status: ACTIVE | COMMUNITY
Start: 2019-12-10

## 2019-12-12 ENCOUNTER — APPOINTMENT (OUTPATIENT)
Dept: OBGYN | Facility: CLINIC | Age: 34
End: 2019-12-12
Payer: COMMERCIAL

## 2019-12-12 PROCEDURE — 96372 THER/PROPH/DIAG INJ SC/IM: CPT

## 2019-12-12 RX ORDER — HYDROXYPROGESTERONE CAPROATE 250 MG/ML
250 INJECTION INTRAMUSCULAR
Qty: 1 | Refills: 0 | Status: COMPLETED | OUTPATIENT
Start: 2019-12-12

## 2019-12-16 ENCOUNTER — APPOINTMENT (OUTPATIENT)
Dept: ANTEPARTUM | Facility: CLINIC | Age: 34
End: 2019-12-16
Payer: COMMERCIAL

## 2019-12-16 PROCEDURE — 76817 TRANSVAGINAL US OBSTETRIC: CPT

## 2019-12-19 ENCOUNTER — APPOINTMENT (OUTPATIENT)
Dept: OBGYN | Facility: CLINIC | Age: 34
End: 2019-12-19
Payer: COMMERCIAL

## 2019-12-19 PROCEDURE — 96372 THER/PROPH/DIAG INJ SC/IM: CPT

## 2019-12-19 RX ORDER — HYDROXYPROGESTERONE CAPROATE 250 MG/ML
250 INJECTION INTRAMUSCULAR
Qty: 1 | Refills: 0 | Status: COMPLETED | OUTPATIENT
Start: 2019-12-19

## 2019-12-26 ENCOUNTER — APPOINTMENT (OUTPATIENT)
Dept: OBGYN | Facility: CLINIC | Age: 34
End: 2019-12-26
Payer: COMMERCIAL

## 2019-12-26 PROCEDURE — 96372 THER/PROPH/DIAG INJ SC/IM: CPT

## 2019-12-26 RX ORDER — MEDROXYPROGESTERONE ACETATE 150 MG/ML
150 INJECTION, SUSPENSION INTRAMUSCULAR
Refills: 0 | Status: COMPLETED | OUTPATIENT
Start: 2019-12-26

## 2019-12-26 RX ADMIN — Medication 0 MG/ML: at 00:00

## 2019-12-27 RX ORDER — HYDROXYPROGESTERONE CAPROATE 250 MG/ML
275 INJECTION SUBCUTANEOUS
Qty: 4 | Refills: 4 | Status: ACTIVE | COMMUNITY
Start: 2019-12-27

## 2019-12-30 ENCOUNTER — APPOINTMENT (OUTPATIENT)
Dept: ANTEPARTUM | Facility: CLINIC | Age: 34
End: 2019-12-30
Payer: COMMERCIAL

## 2019-12-30 PROCEDURE — 76817 TRANSVAGINAL US OBSTETRIC: CPT

## 2019-12-30 PROCEDURE — 76811 OB US DETAILED SNGL FETUS: CPT

## 2020-01-01 ENCOUNTER — LABORATORY RESULT (OUTPATIENT)
Age: 35
End: 2020-01-01

## 2020-01-02 ENCOUNTER — NON-APPOINTMENT (OUTPATIENT)
Age: 35
End: 2020-01-02

## 2020-01-02 ENCOUNTER — APPOINTMENT (OUTPATIENT)
Dept: OBGYN | Facility: CLINIC | Age: 35
End: 2020-01-02
Payer: COMMERCIAL

## 2020-01-02 ENCOUNTER — RX RENEWAL (OUTPATIENT)
Age: 35
End: 2020-01-02

## 2020-01-02 VITALS
HEIGHT: 60 IN | BODY MASS INDEX: 25.32 KG/M2 | WEIGHT: 129 LBS | SYSTOLIC BLOOD PRESSURE: 131 MMHG | DIASTOLIC BLOOD PRESSURE: 87 MMHG

## 2020-01-02 PROCEDURE — 0502F SUBSEQUENT PRENATAL CARE: CPT

## 2020-01-02 PROCEDURE — 96372 THER/PROPH/DIAG INJ SC/IM: CPT

## 2020-01-02 RX ORDER — HYDROXYPROGESTERONE CAPROATE 250 MG/ML
250 INJECTION INTRAMUSCULAR
Qty: 1 | Refills: 0 | Status: COMPLETED | OUTPATIENT
Start: 2020-01-02

## 2020-01-06 ENCOUNTER — APPOINTMENT (OUTPATIENT)
Dept: ANTEPARTUM | Facility: CLINIC | Age: 35
End: 2020-01-06
Payer: COMMERCIAL

## 2020-01-06 PROCEDURE — 76815 OB US LIMITED FETUS(S): CPT

## 2020-01-06 PROCEDURE — 76817 TRANSVAGINAL US OBSTETRIC: CPT

## 2020-01-09 ENCOUNTER — APPOINTMENT (OUTPATIENT)
Dept: OBGYN | Facility: CLINIC | Age: 35
End: 2020-01-09
Payer: COMMERCIAL

## 2020-01-09 PROCEDURE — 96372 THER/PROPH/DIAG INJ SC/IM: CPT

## 2020-01-09 RX ORDER — HYDROXYPROGESTERONE CAPROATE 250 MG/ML
250 INJECTION INTRAMUSCULAR
Qty: 1 | Refills: 0 | Status: COMPLETED | OUTPATIENT
Start: 2020-01-09

## 2020-01-16 ENCOUNTER — APPOINTMENT (OUTPATIENT)
Dept: OBGYN | Facility: CLINIC | Age: 35
End: 2020-01-16
Payer: COMMERCIAL

## 2020-01-16 ENCOUNTER — APPOINTMENT (OUTPATIENT)
Dept: ANTEPARTUM | Facility: CLINIC | Age: 35
End: 2020-01-16
Payer: COMMERCIAL

## 2020-01-16 PROCEDURE — 96372 THER/PROPH/DIAG INJ SC/IM: CPT

## 2020-01-16 PROCEDURE — 76817 TRANSVAGINAL US OBSTETRIC: CPT

## 2020-01-16 RX ORDER — HYDROXYPROGESTERONE CAPROATE 250 MG/ML
275 INJECTION SUBCUTANEOUS
Refills: 0 | Status: COMPLETED | OUTPATIENT
Start: 2020-01-16

## 2020-01-16 RX ADMIN — HYDROXYPROGESTERONE CAPROATE 0 MG/1.1ML: 250 INJECTION SUBCUTANEOUS at 00:00

## 2020-01-23 ENCOUNTER — APPOINTMENT (OUTPATIENT)
Dept: OBGYN | Facility: CLINIC | Age: 35
End: 2020-01-23
Payer: COMMERCIAL

## 2020-01-23 PROCEDURE — 96372 THER/PROPH/DIAG INJ SC/IM: CPT

## 2020-01-23 RX ORDER — HYDROXYPROGESTERONE CAPROATE 250 MG/ML
275 INJECTION SUBCUTANEOUS
Refills: 0 | Status: COMPLETED | OUTPATIENT
Start: 2020-01-23

## 2020-01-23 RX ADMIN — HYDROXYPROGESTERONE CAPROATE 0 MG/1.1ML: 250 INJECTION SUBCUTANEOUS at 00:00

## 2020-01-30 ENCOUNTER — APPOINTMENT (OUTPATIENT)
Dept: OBGYN | Facility: CLINIC | Age: 35
End: 2020-01-30
Payer: COMMERCIAL

## 2020-01-30 PROCEDURE — 96372 THER/PROPH/DIAG INJ SC/IM: CPT

## 2020-01-31 RX ORDER — HYDROXYPROGESTERONE CAPROATE 250 MG/ML
275 INJECTION SUBCUTANEOUS
Refills: 0 | Status: COMPLETED | OUTPATIENT
Start: 2020-01-31

## 2020-01-31 RX ADMIN — HYDROXYPROGESTERONE CAPROATE 0 MG/1.1ML: 250 INJECTION SUBCUTANEOUS at 00:00

## 2020-02-06 ENCOUNTER — APPOINTMENT (OUTPATIENT)
Dept: OBGYN | Facility: CLINIC | Age: 35
End: 2020-02-06
Payer: COMMERCIAL

## 2020-02-06 ENCOUNTER — NON-APPOINTMENT (OUTPATIENT)
Age: 35
End: 2020-02-06

## 2020-02-06 VITALS
HEIGHT: 60 IN | SYSTOLIC BLOOD PRESSURE: 126 MMHG | BODY MASS INDEX: 26.31 KG/M2 | DIASTOLIC BLOOD PRESSURE: 79 MMHG | WEIGHT: 134 LBS

## 2020-02-06 PROCEDURE — 36415 COLL VENOUS BLD VENIPUNCTURE: CPT

## 2020-02-06 PROCEDURE — 96372 THER/PROPH/DIAG INJ SC/IM: CPT

## 2020-02-06 PROCEDURE — 0502F SUBSEQUENT PRENATAL CARE: CPT

## 2020-02-06 RX ORDER — HYDROXYPROGESTERONE CAPROATE 250 MG/ML
275 INJECTION SUBCUTANEOUS
Refills: 0 | Status: COMPLETED | OUTPATIENT
Start: 2020-02-06

## 2020-02-06 RX ADMIN — HYDROXYPROGESTERONE CAPROATE 0 MG/1.1ML: 250 INJECTION SUBCUTANEOUS at 00:00

## 2020-02-07 ENCOUNTER — APPOINTMENT (OUTPATIENT)
Dept: OBGYN | Facility: CLINIC | Age: 35
End: 2020-02-07

## 2020-02-07 LAB
BASOPHILS # BLD AUTO: 0.02 K/UL
BASOPHILS NFR BLD AUTO: 0.3 %
EOSINOPHIL # BLD AUTO: 0.2 K/UL
EOSINOPHIL NFR BLD AUTO: 2.5 %
GLUCOSE 1H P 50 G GLC PO SERPL-MCNC: 133 MG/DL
HCT VFR BLD CALC: 39 %
HGB BLD-MCNC: 12.4 G/DL
IMM GRANULOCYTES NFR BLD AUTO: 1.4 %
LYMPHOCYTES # BLD AUTO: 1.48 K/UL
LYMPHOCYTES NFR BLD AUTO: 18.8 %
MAN DIFF?: NORMAL
MCHC RBC-ENTMCNC: 30.7 PG
MCHC RBC-ENTMCNC: 31.8 GM/DL
MCV RBC AUTO: 96.5 FL
MONOCYTES # BLD AUTO: 0.52 K/UL
MONOCYTES NFR BLD AUTO: 6.6 %
NEUTROPHILS # BLD AUTO: 5.53 K/UL
NEUTROPHILS NFR BLD AUTO: 70.4 %
PLATELET # BLD AUTO: 181 K/UL
RBC # BLD: 4.04 M/UL
RBC # FLD: 13 %
WBC # FLD AUTO: 7.86 K/UL

## 2020-02-10 ENCOUNTER — APPOINTMENT (OUTPATIENT)
Dept: ANTEPARTUM | Facility: CLINIC | Age: 35
End: 2020-02-10
Payer: COMMERCIAL

## 2020-02-10 PROCEDURE — 76817 TRANSVAGINAL US OBSTETRIC: CPT

## 2020-02-13 ENCOUNTER — APPOINTMENT (OUTPATIENT)
Dept: OBGYN | Facility: CLINIC | Age: 35
End: 2020-02-13
Payer: COMMERCIAL

## 2020-02-13 PROCEDURE — 96372 THER/PROPH/DIAG INJ SC/IM: CPT

## 2020-02-13 RX ORDER — HYDROXYPROGESTERONE CAPROATE 250 MG/ML
275 INJECTION SUBCUTANEOUS
Refills: 0 | Status: COMPLETED | OUTPATIENT
Start: 2020-02-13

## 2020-02-20 ENCOUNTER — APPOINTMENT (OUTPATIENT)
Dept: OBGYN | Facility: CLINIC | Age: 35
End: 2020-02-20
Payer: COMMERCIAL

## 2020-02-20 PROCEDURE — 96372 THER/PROPH/DIAG INJ SC/IM: CPT

## 2020-02-20 RX ORDER — HYDROXYPROGESTERONE CAPROATE 250 MG/ML
275 INJECTION SUBCUTANEOUS
Refills: 0 | Status: COMPLETED | OUTPATIENT
Start: 2020-02-20

## 2020-02-20 RX ADMIN — Medication 0 MG/1.1ML: at 00:00

## 2020-02-27 ENCOUNTER — APPOINTMENT (OUTPATIENT)
Dept: OBGYN | Facility: CLINIC | Age: 35
End: 2020-02-27
Payer: COMMERCIAL

## 2020-02-27 PROCEDURE — 96372 THER/PROPH/DIAG INJ SC/IM: CPT

## 2020-02-27 RX ORDER — HYDROXYPROGESTERONE CAPROATE 250 MG/ML
275 INJECTION SUBCUTANEOUS
Refills: 0 | Status: COMPLETED | OUTPATIENT
Start: 2020-02-27

## 2020-03-05 ENCOUNTER — APPOINTMENT (OUTPATIENT)
Dept: OBGYN | Facility: CLINIC | Age: 35
End: 2020-03-05
Payer: COMMERCIAL

## 2020-03-05 ENCOUNTER — ASOB RESULT (OUTPATIENT)
Age: 35
End: 2020-03-05

## 2020-03-05 ENCOUNTER — APPOINTMENT (OUTPATIENT)
Dept: ANTEPARTUM | Facility: CLINIC | Age: 35
End: 2020-03-05
Payer: COMMERCIAL

## 2020-03-05 PROCEDURE — 76805 OB US >/= 14 WKS SNGL FETUS: CPT

## 2020-03-05 PROCEDURE — 96372 THER/PROPH/DIAG INJ SC/IM: CPT

## 2020-03-05 RX ORDER — HYDROXYPROGESTERONE CAPROATE 250 MG/ML
275 INJECTION SUBCUTANEOUS
Refills: 0 | Status: COMPLETED | OUTPATIENT
Start: 2020-03-05

## 2020-03-05 RX ADMIN — Medication 0 MG/1.1ML: at 00:00

## 2020-03-09 ENCOUNTER — APPOINTMENT (OUTPATIENT)
Dept: OBGYN | Facility: CLINIC | Age: 35
End: 2020-03-09

## 2020-03-11 ENCOUNTER — APPOINTMENT (OUTPATIENT)
Dept: OBGYN | Facility: CLINIC | Age: 35
End: 2020-03-11
Payer: COMMERCIAL

## 2020-03-11 ENCOUNTER — NON-APPOINTMENT (OUTPATIENT)
Age: 35
End: 2020-03-11

## 2020-03-11 VITALS
DIASTOLIC BLOOD PRESSURE: 75 MMHG | BODY MASS INDEX: 27.09 KG/M2 | HEIGHT: 60 IN | WEIGHT: 138 LBS | SYSTOLIC BLOOD PRESSURE: 123 MMHG

## 2020-03-11 PROCEDURE — 96372 THER/PROPH/DIAG INJ SC/IM: CPT

## 2020-03-11 PROCEDURE — 90715 TDAP VACCINE 7 YRS/> IM: CPT

## 2020-03-11 PROCEDURE — 0502F SUBSEQUENT PRENATAL CARE: CPT

## 2020-03-11 PROCEDURE — 90471 IMMUNIZATION ADMIN: CPT

## 2020-03-11 RX ORDER — HYDROXYPROGESTERONE CAPROATE 250 MG/ML
275 INJECTION SUBCUTANEOUS
Refills: 0 | Status: COMPLETED | OUTPATIENT
Start: 2020-03-11

## 2020-03-11 RX ADMIN — Medication 0 MG/1.1ML: at 00:00

## 2020-03-12 ENCOUNTER — APPOINTMENT (OUTPATIENT)
Dept: OBGYN | Facility: CLINIC | Age: 35
End: 2020-03-12

## 2020-03-19 ENCOUNTER — APPOINTMENT (OUTPATIENT)
Dept: OBGYN | Facility: CLINIC | Age: 35
End: 2020-03-19

## 2020-03-20 NOTE — OB PROVIDER H&P - NS_ANESTHECONSULT_OBGYN_ALL_OB
Dear Doctor,    You have received a request to fill out a WKC-16/WKC-16B form on the above patient.      The request has been scanned to the [employer] Forms Encounter and can be found in your In Basket-Chart Folder & under the Media tab in the patient's chart.    Please review and sign the request, then interoffice mail the original to the health information department at the [facility], ATTN: Work Comp Forms - HIM. Once received, the form will be forwarded to the requestor, scanned into this encounter, and payment will be requested.    Thank you,    Work Comp Forms - [facility], [facility]     No

## 2020-03-26 ENCOUNTER — APPOINTMENT (OUTPATIENT)
Dept: OBGYN | Facility: CLINIC | Age: 35
End: 2020-03-26

## 2020-04-13 ENCOUNTER — APPOINTMENT (OUTPATIENT)
Dept: OBGYN | Facility: CLINIC | Age: 35
End: 2020-04-13
Payer: COMMERCIAL

## 2020-04-13 ENCOUNTER — LABORATORY RESULT (OUTPATIENT)
Age: 35
End: 2020-04-13

## 2020-04-13 VITALS
DIASTOLIC BLOOD PRESSURE: 83 MMHG | BODY MASS INDEX: 27.88 KG/M2 | WEIGHT: 142 LBS | HEIGHT: 60 IN | SYSTOLIC BLOOD PRESSURE: 124 MMHG

## 2020-04-13 PROCEDURE — 0502F SUBSEQUENT PRENATAL CARE: CPT

## 2020-04-14 ENCOUNTER — APPOINTMENT (OUTPATIENT)
Dept: ANTEPARTUM | Facility: CLINIC | Age: 35
End: 2020-04-14
Payer: COMMERCIAL

## 2020-04-14 ENCOUNTER — ASOB RESULT (OUTPATIENT)
Age: 35
End: 2020-04-14

## 2020-04-14 PROCEDURE — 76816 OB US FOLLOW-UP PER FETUS: CPT

## 2020-04-14 PROCEDURE — 76819 FETAL BIOPHYS PROFIL W/O NST: CPT

## 2020-04-16 ENCOUNTER — APPOINTMENT (OUTPATIENT)
Dept: OBGYN | Facility: CLINIC | Age: 35
End: 2020-04-16
Payer: COMMERCIAL

## 2020-04-16 PROCEDURE — 59871 REMOVE CERCLAGE SUTURE: CPT

## 2020-04-17 LAB
GP B STREP DNA SPEC QL NAA+PROBE: NORMAL
GP B STREP DNA SPEC QL NAA+PROBE: NOT DETECTED
SOURCE GBS: NORMAL

## 2020-04-22 ENCOUNTER — APPOINTMENT (OUTPATIENT)
Dept: OBGYN | Facility: CLINIC | Age: 35
End: 2020-04-22

## 2020-04-29 ENCOUNTER — APPOINTMENT (OUTPATIENT)
Dept: OBGYN | Facility: CLINIC | Age: 35
End: 2020-04-29
Payer: COMMERCIAL

## 2020-04-29 ENCOUNTER — NON-APPOINTMENT (OUTPATIENT)
Age: 35
End: 2020-04-29

## 2020-04-29 VITALS
BODY MASS INDEX: 28.57 KG/M2 | DIASTOLIC BLOOD PRESSURE: 79 MMHG | HEIGHT: 60 IN | SYSTOLIC BLOOD PRESSURE: 125 MMHG | WEIGHT: 145.5 LBS | HEART RATE: 101 BPM

## 2020-04-29 PROCEDURE — 0502F SUBSEQUENT PRENATAL CARE: CPT

## 2020-05-06 ENCOUNTER — APPOINTMENT (OUTPATIENT)
Dept: OBGYN | Facility: CLINIC | Age: 35
End: 2020-05-06
Payer: COMMERCIAL

## 2020-05-06 ENCOUNTER — NON-APPOINTMENT (OUTPATIENT)
Age: 35
End: 2020-05-06

## 2020-05-06 VITALS
DIASTOLIC BLOOD PRESSURE: 91 MMHG | WEIGHT: 147 LBS | BODY MASS INDEX: 28.86 KG/M2 | HEIGHT: 60 IN | SYSTOLIC BLOOD PRESSURE: 133 MMHG

## 2020-05-06 PROCEDURE — 0502F SUBSEQUENT PRENATAL CARE: CPT

## 2020-05-13 ENCOUNTER — APPOINTMENT (OUTPATIENT)
Dept: OBGYN | Facility: CLINIC | Age: 35
End: 2020-05-13
Payer: COMMERCIAL

## 2020-05-13 ENCOUNTER — ASOB RESULT (OUTPATIENT)
Age: 35
End: 2020-05-13

## 2020-05-13 ENCOUNTER — NON-APPOINTMENT (OUTPATIENT)
Age: 35
End: 2020-05-13

## 2020-05-13 VITALS
DIASTOLIC BLOOD PRESSURE: 81 MMHG | BODY MASS INDEX: 29.25 KG/M2 | SYSTOLIC BLOOD PRESSURE: 124 MMHG | HEIGHT: 60 IN | WEIGHT: 149 LBS

## 2020-05-13 PROCEDURE — 59025 FETAL NON-STRESS TEST: CPT | Mod: 59

## 2020-05-13 PROCEDURE — 0502F SUBSEQUENT PRENATAL CARE: CPT

## 2020-05-13 PROCEDURE — 76819 FETAL BIOPHYS PROFIL W/O NST: CPT

## 2020-05-18 ENCOUNTER — ASOB RESULT (OUTPATIENT)
Age: 35
End: 2020-05-18

## 2020-05-18 ENCOUNTER — APPOINTMENT (OUTPATIENT)
Dept: OBGYN | Facility: CLINIC | Age: 35
End: 2020-05-18
Payer: COMMERCIAL

## 2020-05-18 ENCOUNTER — NON-APPOINTMENT (OUTPATIENT)
Age: 35
End: 2020-05-18

## 2020-05-18 DIAGNOSIS — O09.529 SUPERVISION OF ELDERLY MULTIGRAVIDA, UNSPECIFIED TRIMESTER: ICD-10-CM

## 2020-05-18 PROCEDURE — 76818 FETAL BIOPHYS PROFILE W/NST: CPT

## 2020-05-18 PROCEDURE — 76819 FETAL BIOPHYS PROFIL W/O NST: CPT | Mod: 59

## 2020-05-21 ENCOUNTER — NON-APPOINTMENT (OUTPATIENT)
Age: 35
End: 2020-05-21

## 2020-05-21 ENCOUNTER — APPOINTMENT (OUTPATIENT)
Dept: OBGYN | Facility: CLINIC | Age: 35
End: 2020-05-21
Payer: COMMERCIAL

## 2020-05-21 ENCOUNTER — APPOINTMENT (OUTPATIENT)
Dept: ANTEPARTUM | Facility: CLINIC | Age: 35
End: 2020-05-21

## 2020-05-21 ENCOUNTER — INPATIENT (INPATIENT)
Facility: HOSPITAL | Age: 35
LOS: 2 days | Discharge: HOME | End: 2020-05-24
Attending: OBSTETRICS & GYNECOLOGY | Admitting: OBSTETRICS & GYNECOLOGY
Payer: COMMERCIAL

## 2020-05-21 ENCOUNTER — APPOINTMENT (OUTPATIENT)
Dept: OBGYN | Facility: CLINIC | Age: 35
End: 2020-05-21

## 2020-05-21 ENCOUNTER — ASOB RESULT (OUTPATIENT)
Age: 35
End: 2020-05-21

## 2020-05-21 VITALS
SYSTOLIC BLOOD PRESSURE: 140 MMHG | WEIGHT: 152 LBS | DIASTOLIC BLOOD PRESSURE: 80 MMHG | BODY MASS INDEX: 29.84 KG/M2 | HEIGHT: 60 IN

## 2020-05-21 VITALS — RESPIRATION RATE: 17 BRPM | TEMPERATURE: 98 F

## 2020-05-21 DIAGNOSIS — Z98.890 OTHER SPECIFIED POSTPROCEDURAL STATES: Chronic | ICD-10-CM

## 2020-05-21 DIAGNOSIS — Z3A.00 WEEKS OF GESTATION OF PREGNANCY NOT SPECIFIED: ICD-10-CM

## 2020-05-21 DIAGNOSIS — J33.9 NASAL POLYP, UNSPECIFIED: Chronic | ICD-10-CM

## 2020-05-21 DIAGNOSIS — O36.8390 MATERNAL CARE FOR ABNORMALITIES OF THE FETAL HEART RATE OR RHYTHM, UNSPECIFIED TRIMESTER, NOT APPLICABLE OR UNSPECIFIED: ICD-10-CM

## 2020-05-21 DIAGNOSIS — O26.899 OTHER SPECIFIED PREGNANCY RELATED CONDITIONS, UNSPECIFIED TRIMESTER: ICD-10-CM

## 2020-05-21 LAB
ALBUMIN SERPL ELPH-MCNC: 3.6 G/DL — SIGNIFICANT CHANGE UP (ref 3.3–5)
ALP SERPL-CCNC: 95 U/L — SIGNIFICANT CHANGE UP (ref 40–120)
ALT FLD-CCNC: 25 U/L — SIGNIFICANT CHANGE UP (ref 4–33)
ANION GAP SERPL CALC-SCNC: 12 MMO/L — SIGNIFICANT CHANGE UP (ref 7–14)
APPEARANCE UR: CLEAR — SIGNIFICANT CHANGE UP
APTT BLD: 25 SEC — LOW (ref 27.5–36.3)
AST SERPL-CCNC: 20 U/L — SIGNIFICANT CHANGE UP (ref 4–32)
BASOPHILS # BLD AUTO: 0.02 K/UL — SIGNIFICANT CHANGE UP (ref 0–0.2)
BASOPHILS NFR BLD AUTO: 0.2 % — SIGNIFICANT CHANGE UP (ref 0–2)
BILIRUB SERPL-MCNC: < 0.2 MG/DL — LOW (ref 0.2–1.2)
BILIRUB UR-MCNC: NEGATIVE — SIGNIFICANT CHANGE UP
BLD GP AB SCN SERPL QL: NEGATIVE — SIGNIFICANT CHANGE UP
BLOOD UR QL VISUAL: NEGATIVE — SIGNIFICANT CHANGE UP
BUN SERPL-MCNC: 6 MG/DL — LOW (ref 7–23)
CALCIUM SERPL-MCNC: 9.2 MG/DL — SIGNIFICANT CHANGE UP (ref 8.4–10.5)
CHLORIDE SERPL-SCNC: 103 MMOL/L — SIGNIFICANT CHANGE UP (ref 98–107)
CO2 SERPL-SCNC: 21 MMOL/L — LOW (ref 22–31)
COLOR SPEC: SIGNIFICANT CHANGE UP
CREAT ?TM UR-MCNC: 16.1 MG/DL — SIGNIFICANT CHANGE UP
CREAT SERPL-MCNC: 0.52 MG/DL — SIGNIFICANT CHANGE UP (ref 0.5–1.3)
EOSINOPHIL # BLD AUTO: 0.04 K/UL — SIGNIFICANT CHANGE UP (ref 0–0.5)
EOSINOPHIL NFR BLD AUTO: 0.5 % — SIGNIFICANT CHANGE UP (ref 0–6)
FIBRINOGEN PPP-MCNC: 500 MG/DL — SIGNIFICANT CHANGE UP (ref 300–520)
GLUCOSE SERPL-MCNC: 81 MG/DL — SIGNIFICANT CHANGE UP (ref 70–99)
GLUCOSE UR-MCNC: NEGATIVE — SIGNIFICANT CHANGE UP
HCT VFR BLD CALC: 38 % — SIGNIFICANT CHANGE UP (ref 34.5–45)
HGB BLD-MCNC: 12.6 G/DL — SIGNIFICANT CHANGE UP (ref 11.5–15.5)
IMM GRANULOCYTES NFR BLD AUTO: 1.6 % — HIGH (ref 0–1.5)
INR BLD: 0.85 — LOW (ref 0.88–1.17)
KETONES UR-MCNC: SIGNIFICANT CHANGE UP
LDH SERPL L TO P-CCNC: 179 U/L — SIGNIFICANT CHANGE UP (ref 135–225)
LEUKOCYTE ESTERASE UR-ACNC: NEGATIVE — SIGNIFICANT CHANGE UP
LYMPHOCYTES # BLD AUTO: 1.76 K/UL — SIGNIFICANT CHANGE UP (ref 1–3.3)
LYMPHOCYTES # BLD AUTO: 21.1 % — SIGNIFICANT CHANGE UP (ref 13–44)
MCHC RBC-ENTMCNC: 31 PG — SIGNIFICANT CHANGE UP (ref 27–34)
MCHC RBC-ENTMCNC: 33.2 % — SIGNIFICANT CHANGE UP (ref 32–36)
MCV RBC AUTO: 93.4 FL — SIGNIFICANT CHANGE UP (ref 80–100)
MONOCYTES # BLD AUTO: 0.84 K/UL — SIGNIFICANT CHANGE UP (ref 0–0.9)
MONOCYTES NFR BLD AUTO: 10 % — SIGNIFICANT CHANGE UP (ref 2–14)
NEUTROPHILS # BLD AUTO: 5.57 K/UL — SIGNIFICANT CHANGE UP (ref 1.8–7.4)
NEUTROPHILS NFR BLD AUTO: 66.6 % — SIGNIFICANT CHANGE UP (ref 43–77)
NITRITE UR-MCNC: NEGATIVE — SIGNIFICANT CHANGE UP
NRBC # FLD: 0 K/UL — SIGNIFICANT CHANGE UP (ref 0–0)
PH UR: 7 — SIGNIFICANT CHANGE UP (ref 5–8)
PLATELET # BLD AUTO: 120 K/UL — LOW (ref 150–400)
PMV BLD: 11.9 FL — SIGNIFICANT CHANGE UP (ref 7–13)
POTASSIUM SERPL-MCNC: 3.8 MMOL/L — SIGNIFICANT CHANGE UP (ref 3.5–5.3)
POTASSIUM SERPL-SCNC: 3.8 MMOL/L — SIGNIFICANT CHANGE UP (ref 3.5–5.3)
PROT SERPL-MCNC: 6.5 G/DL — SIGNIFICANT CHANGE UP (ref 6–8.3)
PROT UR-MCNC: < 4 MG/DL — SIGNIFICANT CHANGE UP
PROT UR-MCNC: NEGATIVE — SIGNIFICANT CHANGE UP
PROTHROM AB SERPL-ACNC: 9.7 SEC — LOW (ref 9.8–13.1)
RBC # BLD: 4.07 M/UL — SIGNIFICANT CHANGE UP (ref 3.8–5.2)
RBC # FLD: 13.2 % — SIGNIFICANT CHANGE UP (ref 10.3–14.5)
RH IG SCN BLD-IMP: POSITIVE — SIGNIFICANT CHANGE UP
SODIUM SERPL-SCNC: 136 MMOL/L — SIGNIFICANT CHANGE UP (ref 135–145)
SP GR SPEC: 1.01 — SIGNIFICANT CHANGE UP (ref 1–1.04)
URATE SERPL-MCNC: 3.7 MG/DL — SIGNIFICANT CHANGE UP (ref 2.5–7)
UROBILINOGEN FLD QL: NORMAL — SIGNIFICANT CHANGE UP
WBC # BLD: 8.36 K/UL — SIGNIFICANT CHANGE UP (ref 3.8–10.5)
WBC # FLD AUTO: 8.36 K/UL — SIGNIFICANT CHANGE UP (ref 3.8–10.5)

## 2020-05-21 PROCEDURE — 59025 FETAL NON-STRESS TEST: CPT | Mod: 59

## 2020-05-21 PROCEDURE — 0502F SUBSEQUENT PRENATAL CARE: CPT

## 2020-05-21 PROCEDURE — 76819 FETAL BIOPHYS PROFIL W/O NST: CPT

## 2020-05-21 RX ORDER — SODIUM CHLORIDE 9 MG/ML
1000 INJECTION, SOLUTION INTRAVENOUS
Refills: 0 | Status: DISCONTINUED | OUTPATIENT
Start: 2020-05-21 | End: 2020-05-22

## 2020-05-21 RX ORDER — AMPICILLIN TRIHYDRATE 250 MG
1 CAPSULE ORAL EVERY 4 HOURS
Refills: 0 | Status: DISCONTINUED | OUTPATIENT
Start: 2020-05-21 | End: 2020-05-22

## 2020-05-21 RX ORDER — AMPICILLIN TRIHYDRATE 250 MG
2 CAPSULE ORAL ONCE
Refills: 0 | Status: COMPLETED | OUTPATIENT
Start: 2020-05-21 | End: 2020-05-21

## 2020-05-21 RX ORDER — SODIUM CHLORIDE 9 MG/ML
500 INJECTION, SOLUTION INTRAVENOUS ONCE
Refills: 0 | Status: COMPLETED | OUTPATIENT
Start: 2020-05-21 | End: 2020-05-21

## 2020-05-21 RX ORDER — OXYTOCIN 10 UNIT/ML
333.33 VIAL (ML) INJECTION
Qty: 20 | Refills: 0 | Status: DISCONTINUED | OUTPATIENT
Start: 2020-05-21 | End: 2020-05-23

## 2020-05-21 RX ADMIN — Medication 216 GRAM(S): at 15:50

## 2020-05-21 RX ADMIN — Medication 108 GRAM(S): at 19:52

## 2020-05-21 RX ADMIN — SODIUM CHLORIDE 1000 MILLILITER(S): 9 INJECTION, SOLUTION INTRAVENOUS at 15:15

## 2020-05-21 RX ADMIN — SODIUM CHLORIDE 125 MILLILITER(S): 9 INJECTION, SOLUTION INTRAVENOUS at 15:49

## 2020-05-21 RX ADMIN — Medication 108 GRAM(S): at 23:47

## 2020-05-21 NOTE — OB PROVIDER H&P - ASSESSMENT
Admit for Cat 2  FHT/Post date IOL  PO cytotec for IOL  Pain management PRN  GBS prophylaxis   COVID-19 PCR  D/W Dr. Steen

## 2020-05-21 NOTE — OB PROVIDER TRIAGE NOTE - HISTORY OF PRESENT ILLNESS
35yo  @ 41.1 presents sent from office for evaluation for fetal tachycardia in 170-180 and variable decelerations. Patient had BP of 140/80 in office today. Denies HA, nausea, vomiting, epigastric pain, visual disturbances.  Denies LOF, VB, ctx and reports GFM.   Denies contact with Covid-19     Denies PMH/PSH    OB H/O SAB x 2   22 week PPROM NSD    AP course complicated by cervical cerclage at 17 weeks.   Cerclage removed at 37 weeks,   PV progesterone  GBS positive

## 2020-05-21 NOTE — CHART NOTE - NSCHARTNOTEFT_GEN_A_CORE
R2 Progress Note    Patient seen and examined at bedside for placement of cervical balloon. Cook CB placed without difficulty, inflated to 60cc/60cc (uterine/vaginal). Patient tolerated the procedure well.      NAD  Vital Signs Last 24 Hrs  T(C): 36.9 (21 May 2020 16:45), Max: 37.4 (21 May 2020 15:24)  T(F): 98.42 (21 May 2020 16:45), Max: 99.32 (21 May 2020 15:24)  HR: 106 (21 May 2020 17:05) (90 - 128)  BP: 128/74 (21 May 2020 16:45) (98/54 - 134/81)  BP(mean): --  RR: 16 (21 May 2020 16:45) (16 - 17)  SpO2: 98% (21 May 2020 17:05) (97% - 99%)    SVE 2/70/-3  /mod variability/+accels/+one variable decel  TOCO ctx irreg    A/P 34y  at 41w1d IOL for cat 2 FHT in late term     - labor: c/w PO cytotec, CB  - fetus: cat 2 FHT with variable decel. Overall reassuring with moderate variability and accels. cont toco/efm  - gbs: pos, amp  - pain: no complaints  - covid: pending  - labile BPs: elevated BP in office, normotensive now. No severe features of PEC. Will get a complete set of HELLP labs    d/w Dr. Rivera, in [place]  Priya Hines PGY2  [id number]

## 2020-05-21 NOTE — OB PROVIDER H&P - NSHPPHYSICALEXAM_GEN_ALL_CORE
Assessment reveals VSS BP 98/54, abdomen soft, NT, gravid. Rectal temp 37.4  VE 1/80/-3  EFW 8.0 by palp  BPP 8/8, DAIANA 10.9, vtx     Cat 2 FHT, fetal tachycardia noted 170's, indeterminate baseline at this time. Occasional ctx on toco.  LR 500cc bolus initaited

## 2020-05-21 NOTE — OB RN TRIAGE NOTE - PSH
History of cervical cerclage  done emergently 2014  Nasal polyps    S/P dilation and curettage  2011/2012

## 2020-05-21 NOTE — OB PROVIDER H&P - HISTORY OF PRESENT ILLNESS
35yo  @ 41.1 presents sent from office for evaluation for fetal tachycardia in 170-180 and variable decelerations. Patient had BP of 140/80 in office today. Denies HA, nausea, vomiting, epigastric pain, visual disturbances.  Denies LOF, VB, ctx and reports GFM.   Denies contact with Covid-19     Denies PMH/PSH    OB H/O SAB x 2   22 week PPROM NSD    AP course complicated by cervical cerclage at 17 weeks.   Cerclage removed at 37 weeks,   IM progesterone  GBS positive

## 2020-05-21 NOTE — OB PROVIDER TRIAGE NOTE - NSHPPHYSICALEXAM_GEN_ALL_CORE
Assessment reveals VSS BP 98/54, abdomen soft, NT, gravid. Rectal temp 37.4  VE 1/80/-3  EFW 8.0 by palp  BPP 8/8, DAIANA 10.9, vtx     Cat 2 FHT, fetal tachycardia noted 170's, indetrerminate baseline. Occasional ctx on toco.  LR 500cc bolus initaited

## 2020-05-21 NOTE — OB RN TRIAGE NOTE - CHIEF COMPLAINT QUOTE
sent from MD office for tracing, variables/tachy, elevated BP  denies headache, visual changes, SOB, chest/epigastric pain, dizziness, vomiting or swelling

## 2020-05-22 ENCOUNTER — TRANSCRIPTION ENCOUNTER (OUTPATIENT)
Age: 35
End: 2020-05-22

## 2020-05-22 LAB
SARS-COV-2 RNA SPEC QL NAA+PROBE: SIGNIFICANT CHANGE UP
T PALLIDUM AB TITR SER: NEGATIVE — SIGNIFICANT CHANGE UP

## 2020-05-22 PROCEDURE — 59400 OBSTETRICAL CARE: CPT

## 2020-05-22 RX ORDER — IBUPROFEN 200 MG
600 TABLET ORAL EVERY 6 HOURS
Refills: 0 | Status: COMPLETED | OUTPATIENT
Start: 2020-05-22 | End: 2021-04-20

## 2020-05-22 RX ORDER — LANOLIN
1 OINTMENT (GRAM) TOPICAL EVERY 6 HOURS
Refills: 0 | Status: DISCONTINUED | OUTPATIENT
Start: 2020-05-22 | End: 2020-05-24

## 2020-05-22 RX ORDER — OXYCODONE HYDROCHLORIDE 5 MG/1
5 TABLET ORAL ONCE
Refills: 0 | Status: DISCONTINUED | OUTPATIENT
Start: 2020-05-22 | End: 2020-05-24

## 2020-05-22 RX ORDER — SODIUM CHLORIDE 9 MG/ML
500 INJECTION, SOLUTION INTRAVENOUS ONCE
Refills: 0 | Status: COMPLETED | OUTPATIENT
Start: 2020-05-22 | End: 2020-05-22

## 2020-05-22 RX ORDER — ACETAMINOPHEN 500 MG
975 TABLET ORAL
Refills: 0 | Status: DISCONTINUED | OUTPATIENT
Start: 2020-05-22 | End: 2020-05-24

## 2020-05-22 RX ORDER — SIMETHICONE 80 MG/1
80 TABLET, CHEWABLE ORAL EVERY 4 HOURS
Refills: 0 | Status: DISCONTINUED | OUTPATIENT
Start: 2020-05-22 | End: 2020-05-24

## 2020-05-22 RX ORDER — TETANUS TOXOID, REDUCED DIPHTHERIA TOXOID AND ACELLULAR PERTUSSIS VACCINE, ADSORBED 5; 2.5; 8; 8; 2.5 [IU]/.5ML; [IU]/.5ML; UG/.5ML; UG/.5ML; UG/.5ML
0.5 SUSPENSION INTRAMUSCULAR ONCE
Refills: 0 | Status: DISCONTINUED | OUTPATIENT
Start: 2020-05-22 | End: 2020-05-24

## 2020-05-22 RX ORDER — ONDANSETRON 8 MG/1
4 TABLET, FILM COATED ORAL ONCE
Refills: 0 | Status: COMPLETED | OUTPATIENT
Start: 2020-05-22 | End: 2020-05-22

## 2020-05-22 RX ORDER — HYDROCORTISONE 1 %
1 OINTMENT (GRAM) TOPICAL EVERY 6 HOURS
Refills: 0 | Status: DISCONTINUED | OUTPATIENT
Start: 2020-05-22 | End: 2020-05-24

## 2020-05-22 RX ORDER — MAGNESIUM HYDROXIDE 400 MG/1
30 TABLET, CHEWABLE ORAL
Refills: 0 | Status: DISCONTINUED | OUTPATIENT
Start: 2020-05-22 | End: 2020-05-24

## 2020-05-22 RX ORDER — OXYCODONE HYDROCHLORIDE 5 MG/1
5 TABLET ORAL
Refills: 0 | Status: DISCONTINUED | OUTPATIENT
Start: 2020-05-22 | End: 2020-05-24

## 2020-05-22 RX ORDER — OXYTOCIN 10 UNIT/ML
2 VIAL (ML) INJECTION
Qty: 30 | Refills: 0 | Status: DISCONTINUED | OUTPATIENT
Start: 2020-05-22 | End: 2020-05-22

## 2020-05-22 RX ORDER — DIBUCAINE 1 %
1 OINTMENT (GRAM) RECTAL EVERY 6 HOURS
Refills: 0 | Status: DISCONTINUED | OUTPATIENT
Start: 2020-05-22 | End: 2020-05-24

## 2020-05-22 RX ORDER — OXYTOCIN 10 UNIT/ML
333.33 VIAL (ML) INJECTION
Qty: 20 | Refills: 0 | Status: DISCONTINUED | OUTPATIENT
Start: 2020-05-22 | End: 2020-05-23

## 2020-05-22 RX ORDER — SODIUM CHLORIDE 9 MG/ML
3 INJECTION INTRAMUSCULAR; INTRAVENOUS; SUBCUTANEOUS EVERY 8 HOURS
Refills: 0 | Status: DISCONTINUED | OUTPATIENT
Start: 2020-05-22 | End: 2020-05-24

## 2020-05-22 RX ORDER — BENZOCAINE 10 %
1 GEL (GRAM) MUCOUS MEMBRANE EVERY 6 HOURS
Refills: 0 | Status: DISCONTINUED | OUTPATIENT
Start: 2020-05-22 | End: 2020-05-24

## 2020-05-22 RX ORDER — AER TRAVELER 0.5 G/1
1 SOLUTION RECTAL; TOPICAL EVERY 4 HOURS
Refills: 0 | Status: DISCONTINUED | OUTPATIENT
Start: 2020-05-22 | End: 2020-05-24

## 2020-05-22 RX ORDER — PRAMOXINE HYDROCHLORIDE 150 MG/15G
1 AEROSOL, FOAM RECTAL EVERY 4 HOURS
Refills: 0 | Status: DISCONTINUED | OUTPATIENT
Start: 2020-05-22 | End: 2020-05-24

## 2020-05-22 RX ORDER — DIPHENHYDRAMINE HCL 50 MG
25 CAPSULE ORAL EVERY 6 HOURS
Refills: 0 | Status: DISCONTINUED | OUTPATIENT
Start: 2020-05-22 | End: 2020-05-24

## 2020-05-22 RX ORDER — KETOROLAC TROMETHAMINE 30 MG/ML
30 SYRINGE (ML) INJECTION ONCE
Refills: 0 | Status: DISCONTINUED | OUTPATIENT
Start: 2020-05-22 | End: 2020-05-22

## 2020-05-22 RX ADMIN — Medication 108 GRAM(S): at 12:00

## 2020-05-22 RX ADMIN — Medication 1000 MILLIUNIT(S)/MIN: at 22:26

## 2020-05-22 RX ADMIN — SODIUM CHLORIDE 125 MILLILITER(S): 9 INJECTION, SOLUTION INTRAVENOUS at 08:20

## 2020-05-22 RX ADMIN — Medication 2 MILLIUNIT(S)/MIN: at 04:07

## 2020-05-22 RX ADMIN — ONDANSETRON 4 MILLIGRAM(S): 8 TABLET, FILM COATED ORAL at 09:26

## 2020-05-22 RX ADMIN — SODIUM CHLORIDE 1500 MILLILITER(S): 9 INJECTION, SOLUTION INTRAVENOUS at 13:10

## 2020-05-22 RX ADMIN — Medication 108 GRAM(S): at 16:24

## 2020-05-22 RX ADMIN — Medication 108 GRAM(S): at 04:00

## 2020-05-22 RX ADMIN — Medication 108 GRAM(S): at 08:16

## 2020-05-22 RX ADMIN — Medication 30 MILLIGRAM(S): at 21:44

## 2020-05-22 NOTE — OB NEONATOLOGY/PEDIATRICIAN DELIVERY SUMMARY - NSPEDSNEONOTESA_OBGYN_ALL_OB_FT
41+2 week GA baby boy born to a 36 year old  mother via vacuum assisted VD with cat II tracings. Mom is B+, labs negative, GBS + treated with amp x 7. No significant maternal history. SROM 0740 with Mec, highest maternal temp 37.2 degC, EOS0.15. Baby born with poor tone, umbilical cord clamped and brought to preheated warmer, dried, suctioned and stimulated. Started PPV at 1 minute of life due to poor respiratory effort. Increased to 50% FiO2, responded appropriately, weaned to room air and started skin to skin. APGAR 7/9 at 1 and 5 minutes respectively.

## 2020-05-22 NOTE — CHART NOTE - NSCHARTNOTEFT_GEN_A_CORE
R1 OB Labor Note    S: Patient seen and examined at bedside for cervical castillo removal.     Vital Signs Last 24 Hrs  T(C): 36.9 (21 May 2020 20:56), Max: 37.4 (21 May 2020 15:24)  T(F): 98.42 (21 May 2020 20:56), Max: 99.32 (21 May 2020 15:24)  HR: 80 (22 May 2020 00:51) (78 - 128)  BP: 138/67 (22 May 2020 00:34) (98/54 - 138/67)  BP(mean): --  RR: 17 (21 May 2020 20:56) (16 - 17)  SpO2: 95% (22 May 2020 00:51) (79% - 99%)    FHT: 140, mod stas, +accels, no decels  [place]: q3-5m  SVE: 4-5/60/-3    A/P:   - Labor: sp CB, continue PO x1 more dose, 4epi -> pit  - Fetus: cat I tracing  - Pain: 4 epi    ADomney PGY-1  d/w Dr. Rivear

## 2020-05-22 NOTE — OB RN DELIVERY SUMMARY - NS_SEPSISRSKCALC_OBGYN_ALL_OB_FT
EOS calculated successfully. EOS Risk Factor: 0.5/1000 live births ([facility] national incidence); GA=41w2d; Temp=99.32; ROM=12.45; GBS='Positive'; Antibiotics='No antibiotics or any antibiotics < 2 hrs prior to birth'

## 2020-05-22 NOTE — DISCHARGE NOTE OB - CARE PLAN
Principal Discharge DX:	Vacuum extractor delivery, delivered  Goal:	recovery  Assessment and plan of treatment:	pelvic rest, regular diet

## 2020-05-22 NOTE — OB POSTPARTUM EVENT NOTE - NS_EVENTPTSUMMARY2_OBGYN_ALL_OB_FT
straight cath - 500 cc  pt denies light headed/ dizzy/ vision changes/ chest pain/leg pain/ palpitations

## 2020-05-22 NOTE — DISCHARGE NOTE OB - MATERIALS PROVIDED
[facility] Hearing Screen Program/Shaken Baby Prevention Handout/Breastfeeding Guide and Packet/Vaccinations/Guide to Postpartum Care/Birth Certificate Instructions/Back To Sleep Handout

## 2020-05-22 NOTE — CHART NOTE - NSCHARTNOTEFT_GEN_A_CORE
Patient was reassessed at 8:30 am due to c/o leaking fluid  Vital Signs: T(C): 36.0 (22 May 2020 08:17), Max: 37.4 (21 May 2020 15:24)  HR: 85 (22 May 2020 09:03) (73 - 128); BP: 107/54 (22 May 2020 08:59) (92/50 - 187/114)  RR: 15 (22 May 2020 00:46) (15 - 17); SpO2: 100% (22 May 2020 09:03) (79% - 100%)  occasional mild variables, irregular contractions pattern; SVE: 4/80/-2, clear amniotic fluid  case was reviewed with Dr Llanes; continue Pitocin augmentation; reassess in 2 hours or prn.    Angelia Bello CNM

## 2020-05-22 NOTE — DISCHARGE NOTE OB - CARE PROVIDER_API CALL
Jessica Llanes  OBSTETRICS AND GYNECOLOGY  [facility] [place] [address]  Phone: [phone]  Fax: [phone]  Follow Up Time:

## 2020-05-22 NOTE — CHART NOTE - NSCHARTNOTEFT_GEN_A_CORE
Patient is 34y PPD#0 status post VAVD (ebl 400ml), evaluated at bedside for heavy VB and SOB. Abhishek reports Patient is 34y PPD#0 status post VAVD (ebl 400ml), evaluated at bedside for heavy VB and SOB. Patient reports she fees like she cannot take a deep breath. Feels the sensation is getting better. RN concern about the amount of bleeding. Patient is 34y PPD#0 status post VAVD (ebl 400ml), evaluated at bedside for heavy VB and SOB. Patient reports she fees like she cannot take a deep breath. Feels the sensation is getting better. RN concerned about the amount of bleeding. Patient reports she feels a little lightheaded, but thinks it is due to not eating. The nurse just gave her something to eat. HR 80s. BPs normotensive. During orthostatics, HR increased from 77 to 104. BSS performed. Bladder noted to be distended. Some clots seen in the uterus, minimal flow in the cavity. While the patient does not have the urge to void, will have her try, as she had an epidural. Patient voided 600ml in a bed pan. Exam performed, no hematoma palpated in vagina or rectum. On bi-manual, no clots expressed, some blood evacuated. Stiches intact.     PLAN:   -Patient can continue eating, PO hydrating  -Bleeding on exam was appropriate   -Will stand patient up again to ensure she is not dizzy   -Will re-assess how patient feels in 30-60 mins     Casie Chavez PGY-2 Patient is 34y PPD#0 status post VAVD (ebl 400ml), evaluated at bedside for heavy VB and SOB. Patient reports she fees like she cannot take a deep breath. Feels the sensation is getting better. RN concerned about the amount of bleeding. Patient reports she feels a little lightheaded, but thinks it is due to not eating. The nurse just gave her something to eat. HR 80s. BPs normotensive. During orthostatics, HR increased from 77 to 104. BSS performed. Bladder noted to be distended. Some clots seen in the uterus, minimal flow in the cavity. While the patient does not have the urge to void, will have her try, as she had an epidural. Patient voided 600ml in a bed pan. Exam performed, no hematoma palpated in vagina or rectum. On bi-manual, no clots expressed, some blood evacuated. Stiches intact.     PLAN:   -Bleeding on exam was appropriate. Patient can continue eating, PO hydrating  -Will stand patient up again to ensure she is not dizzy   -Will re-assess how patient feels in 30-60 mins     D/w Dr. Vaishali Chavez PGY-2

## 2020-05-22 NOTE — DISCHARGE NOTE OB - MEDICATION SUMMARY - MEDICATIONS TO STOP TAKING
I will STOP taking the medications listed below when I get home from the hospital:    indomethacin 25 mg oral capsule  -- 1 cap(s) by mouth every 6 hours MDD:4 for 48 hours  -- Do not take aspirin or aspirin containing products without knowledge and consent of your physician.  It is very important that you take or use this exactly as directed.  Do not skip doses or discontinue unless directed by your doctor.  May cause drowsiness or dizziness.  Obtain medical advice before taking any non-prescription drugs as some may affect the action of this medication.  Take with food or milk.

## 2020-05-22 NOTE — OB POSTPARTUM EVENT NOTE - NS_EVENTPTSUMMARY1_OBGYN_ALL_OB_FT
37.1 temp  uterus firm & at   122/60 HR 77    112/54 HR 74  114/71  37.1 temp  uterus firm & at   122/60 HR 77    112/54 HR 74  114/71   voided 600 cc  pt compliainig of light headedness/ shortness of breath

## 2020-05-22 NOTE — DISCHARGE NOTE OB - PATIENT PORTAL LINK FT
You can access the FollowMyHealth Patient Portal offered by [facility] by registering at the following website: [email or web address]. By joining [email or web address]’s FollowMyHealth portal, you will also be able to view your health information using other applications (apps) compatible with our system.

## 2020-05-22 NOTE — OB PROVIDER DELIVERY SUMMARY - NSVACUUMDELIVERYDETAILSA _OBGYN_ALL_OB_FT
Discussed risk of scalp hemorrhage.  Verbal consent given.  Excellent maternal effort over LML from oa +2/3 station.  Peds present.

## 2020-05-23 RX ORDER — IBUPROFEN 200 MG
1 TABLET ORAL
Qty: 0 | Refills: 0 | DISCHARGE
Start: 2020-05-23

## 2020-05-23 RX ORDER — IBUPROFEN 200 MG
600 TABLET ORAL EVERY 6 HOURS
Refills: 0 | Status: DISCONTINUED | OUTPATIENT
Start: 2020-05-23 | End: 2020-05-24

## 2020-05-23 RX ORDER — ACETAMINOPHEN 500 MG
3 TABLET ORAL
Qty: 0 | Refills: 0 | DISCHARGE
Start: 2020-05-23

## 2020-05-23 RX ADMIN — SODIUM CHLORIDE 3 MILLILITER(S): 9 INJECTION INTRAMUSCULAR; INTRAVENOUS; SUBCUTANEOUS at 05:52

## 2020-05-23 RX ADMIN — Medication 975 MILLIGRAM(S): at 13:06

## 2020-05-23 RX ADMIN — Medication 1 TABLET(S): at 14:08

## 2020-05-23 RX ADMIN — SODIUM CHLORIDE 3 MILLILITER(S): 9 INJECTION INTRAMUSCULAR; INTRAVENOUS; SUBCUTANEOUS at 14:08

## 2020-05-23 RX ADMIN — Medication 975 MILLIGRAM(S): at 05:51

## 2020-05-23 NOTE — PROGRESS NOTE ADULT - SUBJECTIVE AND OBJECTIVE BOX
Assessment and Plan    Day  1  Vacuum Assisted Vaginal Delivery  She feels well  Continue the current pain medication  Encourage  Ambulation  Encourage regular diet  DVT ppx: SCDs only when not ambulating  Stable, tolerates diet and has normal flatus.  Discharged on Day 2 according to the normal criteria.  Follow up 6 weeks.  Instruction reviewed.

## 2020-05-24 VITALS
OXYGEN SATURATION: 100 % | RESPIRATION RATE: 16 BRPM | DIASTOLIC BLOOD PRESSURE: 76 MMHG | SYSTOLIC BLOOD PRESSURE: 122 MMHG | TEMPERATURE: 98 F | HEART RATE: 85 BPM

## 2020-05-24 RX ADMIN — Medication 600 MILLIGRAM(S): at 18:24

## 2020-05-24 RX ADMIN — Medication 600 MILLIGRAM(S): at 12:46

## 2020-05-24 RX ADMIN — Medication 975 MILLIGRAM(S): at 03:50

## 2020-05-24 RX ADMIN — Medication 600 MILLIGRAM(S): at 00:09

## 2020-05-24 RX ADMIN — Medication 600 MILLIGRAM(S): at 23:55

## 2020-05-24 RX ADMIN — Medication 975 MILLIGRAM(S): at 21:00

## 2020-05-24 RX ADMIN — Medication 975 MILLIGRAM(S): at 08:56

## 2020-05-27 PROBLEM — O03.9 COMPLETE OR UNSPECIFIED SPONTANEOUS ABORTION WITHOUT COMPLICATION: Chronic | Status: ACTIVE | Noted: 2020-05-21

## 2020-05-27 PROBLEM — Z78.9 OTHER SPECIFIED HEALTH STATUS: Chronic | Status: INACTIVE | Noted: 2019-11-11 | Resolved: 2020-05-21

## 2020-05-27 PROBLEM — O03.9 COMPLETE OR UNSPECIFIED SPONTANEOUS ABORTION WITHOUT COMPLICATION: Chronic | Status: INACTIVE | Noted: 2019-11-06 | Resolved: 2019-11-11

## 2020-06-03 DIAGNOSIS — K64.4 RESIDUAL HEMORRHOIDAL SKIN TAGS: ICD-10-CM

## 2020-06-03 RX ORDER — HYDROCORTISONE ACETATE AND PRAMOXINE HYDROCHLORIDE 25; 10 MG/G; MG/G
2.5-1 CREAM TOPICAL
Qty: 1 | Refills: 0 | Status: ACTIVE | COMMUNITY
Start: 2020-06-03 | End: 1900-01-01

## 2020-07-01 ENCOUNTER — APPOINTMENT (OUTPATIENT)
Dept: OBGYN | Facility: CLINIC | Age: 35
End: 2020-07-01
Payer: COMMERCIAL

## 2020-07-01 VITALS
BODY MASS INDEX: 25.32 KG/M2 | WEIGHT: 129 LBS | SYSTOLIC BLOOD PRESSURE: 138 MMHG | HEIGHT: 60 IN | DIASTOLIC BLOOD PRESSURE: 79 MMHG

## 2020-07-01 PROCEDURE — 0503F POSTPARTUM CARE VISIT: CPT

## 2020-07-01 NOTE — HISTORY OF PRESENT ILLNESS
[] : delivered by vaginal delivery [Postpartum Follow Up] : postpartum follow up [Male] : Delivery History: baby boy [BF with Difficulty] : nursing without difficulty [Breastfeeding] : currently nursing [S/Sx PP Depression] : no signs/symptoms of postpartum depression [Back to Normal] : is back to normal in size [Doing Well] : is doing well [Normal] : the vagina was normal [No Sign of Infection] : is showing no signs of infection [None] : None [de-identified] : counseled on mini pill pt undecided at this time

## 2021-02-03 ENCOUNTER — APPOINTMENT (OUTPATIENT)
Dept: OBGYN | Facility: CLINIC | Age: 36
End: 2021-02-03
Payer: COMMERCIAL

## 2021-02-03 VITALS
WEIGHT: 110 LBS | BODY MASS INDEX: 21.6 KG/M2 | SYSTOLIC BLOOD PRESSURE: 128 MMHG | DIASTOLIC BLOOD PRESSURE: 81 MMHG | HEIGHT: 60 IN

## 2021-02-03 PROCEDURE — 99072 ADDL SUPL MATRL&STAF TM PHE: CPT

## 2021-02-03 PROCEDURE — 99395 PREV VISIT EST AGE 18-39: CPT

## 2021-02-06 LAB — HPV HIGH+LOW RISK DNA PNL CVX: NOT DETECTED

## 2021-02-08 LAB — CYTOLOGY CVX/VAG DOC THIN PREP: NORMAL

## 2021-10-20 NOTE — OB RN PATIENT PROFILE - NSSUBSTANCEUSE_GEN_ALL_CORE_SD
Problem: Suicide Risk  Goal: Absence of Self-Harm  Outcome:  Denies presently and agreed to talk to staff if she becomes unsafe.    Pt attending and participating in unit groups/activities.  Pt appropriate and social with staff and peers.  Pt denies SI/Self harm thoughts, urges, plan, and intent.        SI/Self harm: Denies    HI: Denies    AVH: Patient denies    PRN: Melatonin 3 mg, Hydroxyzine 25 mg to help with sleep tonight    Medication AE: None stated or observed    Pain: No complaints of pain    ADLs: Independent    Visits: Mom here to visit.    Vitals: WDL    Patient stated she had a stressful visit with her mom this evening which left her feeling mentally tired. Patient later rejoined group activities and had a brighter affect. Patient appreciated recreational therapy related to therapeutic skill building. She requested written information about the topic for later use.     Patient refused the COVID test this shift and this writer was unsuccessful at getting her to agree to the test later. Patient stated she was pretty tired and hopes the melatonin and hydroxyzine will help her sleep.               never used

## 2022-02-09 ENCOUNTER — APPOINTMENT (OUTPATIENT)
Dept: OBGYN | Facility: CLINIC | Age: 37
End: 2022-02-09
Payer: COMMERCIAL

## 2022-02-09 VITALS
BODY MASS INDEX: 21.2 KG/M2 | WEIGHT: 108 LBS | HEIGHT: 60 IN | DIASTOLIC BLOOD PRESSURE: 79 MMHG | SYSTOLIC BLOOD PRESSURE: 115 MMHG

## 2022-02-09 DIAGNOSIS — R92.2 INCONCLUSIVE MAMMOGRAM: ICD-10-CM

## 2022-02-09 PROCEDURE — 99395 PREV VISIT EST AGE 18-39: CPT

## 2022-02-10 LAB — HPV HIGH+LOW RISK DNA PNL CVX: NOT DETECTED

## 2022-02-14 LAB — CYTOLOGY CVX/VAG DOC THIN PREP: NORMAL

## 2022-03-03 ENCOUNTER — RESULT REVIEW (OUTPATIENT)
Age: 37
End: 2022-03-03

## 2022-03-03 ENCOUNTER — OUTPATIENT (OUTPATIENT)
Dept: OUTPATIENT SERVICES | Facility: HOSPITAL | Age: 37
LOS: 1 days | End: 2022-03-03
Payer: COMMERCIAL

## 2022-03-03 ENCOUNTER — APPOINTMENT (OUTPATIENT)
Dept: MAMMOGRAPHY | Facility: IMAGING CENTER | Age: 37
End: 2022-03-03
Payer: COMMERCIAL

## 2022-03-03 ENCOUNTER — APPOINTMENT (OUTPATIENT)
Dept: ULTRASOUND IMAGING | Facility: IMAGING CENTER | Age: 37
End: 2022-03-03
Payer: COMMERCIAL

## 2022-03-03 DIAGNOSIS — Z98.890 OTHER SPECIFIED POSTPROCEDURAL STATES: Chronic | ICD-10-CM

## 2022-03-03 DIAGNOSIS — J33.9 NASAL POLYP, UNSPECIFIED: Chronic | ICD-10-CM

## 2022-03-03 DIAGNOSIS — Z00.8 ENCOUNTER FOR OTHER GENERAL EXAMINATION: ICD-10-CM

## 2022-03-03 PROCEDURE — 77067 SCR MAMMO BI INCL CAD: CPT | Mod: 26

## 2022-03-03 PROCEDURE — 77067 SCR MAMMO BI INCL CAD: CPT

## 2022-03-03 PROCEDURE — 77063 BREAST TOMOSYNTHESIS BI: CPT | Mod: 26

## 2022-03-03 PROCEDURE — 77063 BREAST TOMOSYNTHESIS BI: CPT

## 2022-03-03 PROCEDURE — 76641 ULTRASOUND BREAST COMPLETE: CPT

## 2022-03-03 PROCEDURE — 76641 ULTRASOUND BREAST COMPLETE: CPT | Mod: 26,50

## 2022-03-07 ENCOUNTER — NON-APPOINTMENT (OUTPATIENT)
Age: 37
End: 2022-03-07

## 2022-03-08 ENCOUNTER — RESULT REVIEW (OUTPATIENT)
Age: 37
End: 2022-03-08

## 2022-03-08 ENCOUNTER — OUTPATIENT (OUTPATIENT)
Dept: OUTPATIENT SERVICES | Facility: HOSPITAL | Age: 37
LOS: 1 days | End: 2022-03-08
Payer: COMMERCIAL

## 2022-03-08 ENCOUNTER — APPOINTMENT (OUTPATIENT)
Dept: MAMMOGRAPHY | Facility: IMAGING CENTER | Age: 37
End: 2022-03-08
Payer: COMMERCIAL

## 2022-03-08 ENCOUNTER — APPOINTMENT (OUTPATIENT)
Dept: ULTRASOUND IMAGING | Facility: IMAGING CENTER | Age: 37
End: 2022-03-08
Payer: COMMERCIAL

## 2022-03-08 DIAGNOSIS — Z00.8 ENCOUNTER FOR OTHER GENERAL EXAMINATION: ICD-10-CM

## 2022-03-08 DIAGNOSIS — J33.9 NASAL POLYP, UNSPECIFIED: Chronic | ICD-10-CM

## 2022-03-08 DIAGNOSIS — Z98.890 OTHER SPECIFIED POSTPROCEDURAL STATES: Chronic | ICD-10-CM

## 2022-03-08 PROCEDURE — 76642 ULTRASOUND BREAST LIMITED: CPT | Mod: 26,LT

## 2022-03-08 PROCEDURE — G0279: CPT

## 2022-03-08 PROCEDURE — 77065 DX MAMMO INCL CAD UNI: CPT | Mod: 26,LT

## 2022-03-08 PROCEDURE — 76642 ULTRASOUND BREAST LIMITED: CPT

## 2022-03-08 PROCEDURE — 77065 DX MAMMO INCL CAD UNI: CPT

## 2022-03-08 PROCEDURE — G0279: CPT | Mod: 26

## 2022-03-09 ENCOUNTER — NON-APPOINTMENT (OUTPATIENT)
Age: 37
End: 2022-03-09

## 2022-09-15 ENCOUNTER — APPOINTMENT (OUTPATIENT)
Dept: MAMMOGRAPHY | Facility: CLINIC | Age: 37
End: 2022-09-15

## 2022-09-15 ENCOUNTER — OUTPATIENT (OUTPATIENT)
Dept: OUTPATIENT SERVICES | Facility: HOSPITAL | Age: 37
LOS: 1 days | End: 2022-09-15

## 2022-09-15 DIAGNOSIS — Z98.890 OTHER SPECIFIED POSTPROCEDURAL STATES: Chronic | ICD-10-CM

## 2022-09-15 DIAGNOSIS — Z00.8 ENCOUNTER FOR OTHER GENERAL EXAMINATION: ICD-10-CM

## 2022-09-15 DIAGNOSIS — J33.9 NASAL POLYP, UNSPECIFIED: Chronic | ICD-10-CM

## 2023-03-16 ENCOUNTER — APPOINTMENT (OUTPATIENT)
Dept: OBGYN | Facility: CLINIC | Age: 38
End: 2023-03-16

## 2023-03-22 ENCOUNTER — APPOINTMENT (OUTPATIENT)
Dept: OBGYN | Facility: CLINIC | Age: 38
End: 2023-03-22

## 2023-03-30 ENCOUNTER — APPOINTMENT (OUTPATIENT)
Dept: OBGYN | Facility: CLINIC | Age: 38
End: 2023-03-30
Payer: COMMERCIAL

## 2023-03-30 ENCOUNTER — RESULT REVIEW (OUTPATIENT)
Age: 38
End: 2023-03-30

## 2023-03-30 VITALS
HEIGHT: 60 IN | DIASTOLIC BLOOD PRESSURE: 86 MMHG | SYSTOLIC BLOOD PRESSURE: 132 MMHG | WEIGHT: 105 LBS | BODY MASS INDEX: 20.62 KG/M2

## 2023-03-30 PROCEDURE — 99395 PREV VISIT EST AGE 18-39: CPT

## 2023-04-05 LAB
CYTOLOGY CVX/VAG DOC THIN PREP: NORMAL
HPV HIGH+LOW RISK DNA PNL CVX: NOT DETECTED

## 2023-04-12 ENCOUNTER — APPOINTMENT (OUTPATIENT)
Dept: ULTRASOUND IMAGING | Facility: CLINIC | Age: 38
End: 2023-04-12
Payer: COMMERCIAL

## 2023-04-12 ENCOUNTER — RESULT REVIEW (OUTPATIENT)
Age: 38
End: 2023-04-12

## 2023-04-12 ENCOUNTER — APPOINTMENT (OUTPATIENT)
Dept: MAMMOGRAPHY | Facility: CLINIC | Age: 38
End: 2023-04-12
Payer: COMMERCIAL

## 2023-04-12 PROCEDURE — G0279: CPT

## 2023-04-12 PROCEDURE — 77066 DX MAMMO INCL CAD BI: CPT

## 2023-04-12 PROCEDURE — 76641 ULTRASOUND BREAST COMPLETE: CPT | Mod: 50

## 2023-04-17 ENCOUNTER — TRANSCRIPTION ENCOUNTER (OUTPATIENT)
Age: 38
End: 2023-04-17

## 2023-04-18 ENCOUNTER — NON-APPOINTMENT (OUTPATIENT)
Age: 38
End: 2023-04-18

## 2024-04-22 ENCOUNTER — APPOINTMENT (OUTPATIENT)
Dept: OBGYN | Facility: CLINIC | Age: 39
End: 2024-04-22
Payer: COMMERCIAL

## 2024-04-22 VITALS
BODY MASS INDEX: 18.61 KG/M2 | DIASTOLIC BLOOD PRESSURE: 81 MMHG | SYSTOLIC BLOOD PRESSURE: 114 MMHG | WEIGHT: 105 LBS | HEIGHT: 63 IN

## 2024-04-22 DIAGNOSIS — Z01.419 ENCOUNTER FOR GYNECOLOGICAL EXAMINATION (GENERAL) (ROUTINE) W/OUT ABNORMAL FINDINGS: ICD-10-CM

## 2024-04-22 PROCEDURE — 99396 PREV VISIT EST AGE 40-64: CPT

## 2024-04-22 PROCEDURE — 99459 PELVIC EXAMINATION: CPT

## 2024-04-22 NOTE — PHYSICAL EXAM
[FreeTextEntry2] : Namita [Appropriately responsive] : appropriately responsive [Alert] : alert [No Acute Distress] : no acute distress [No Lymphadenopathy] : no lymphadenopathy [Regular Rate Rhythm] : regular rate rhythm [No Murmurs] : no murmurs [Clear to Auscultation B/L] : clear to auscultation bilaterally [Soft] : soft [Non-tender] : non-tender [Non-distended] : non-distended [No HSM] : No HSM [No Lesions] : no lesions [No Mass] : no mass [Oriented x3] : oriented x3 [Examination Of The Breasts] : a normal appearance [No Masses] : no breast masses were palpable [Labia Majora] : normal [Labia Minora] : normal [Normal] : normal [Uterine Adnexae] : normal

## 2024-04-22 NOTE — HISTORY OF PRESENT ILLNESS
[Mammogramdate] : 2023 [PapSmeardate] : 2023 [Regular Cycle Intervals] : periods have been regular [FreeTextEntry1] : 2 weeks. ago [No] : Patient does not have concerns regarding sex [Currently Active] : currently active

## 2024-04-22 NOTE — PLAN
[FreeTextEntry1] : Examination Pap smear was done.  Patient was given prescription for mammogram and sonogram.

## 2024-04-25 LAB
CYTOLOGY CVX/VAG DOC THIN PREP: NORMAL
HPV HIGH+LOW RISK DNA PNL CVX: NOT DETECTED

## 2024-05-02 ENCOUNTER — RESULT REVIEW (OUTPATIENT)
Age: 39
End: 2024-05-02

## 2024-05-02 ENCOUNTER — APPOINTMENT (OUTPATIENT)
Dept: MAMMOGRAPHY | Facility: CLINIC | Age: 39
End: 2024-05-02
Payer: COMMERCIAL

## 2024-05-02 ENCOUNTER — APPOINTMENT (OUTPATIENT)
Dept: ULTRASOUND IMAGING | Facility: CLINIC | Age: 39
End: 2024-05-02
Payer: COMMERCIAL

## 2024-05-02 PROCEDURE — 77063 BREAST TOMOSYNTHESIS BI: CPT

## 2024-05-02 PROCEDURE — 76641 ULTRASOUND BREAST COMPLETE: CPT | Mod: 50

## 2024-05-02 PROCEDURE — 77067 SCR MAMMO BI INCL CAD: CPT

## 2024-05-06 DIAGNOSIS — N63.20 UNSPECIFIED LUMP IN THE LEFT BREAST, UNSPECIFIED QUADRANT: ICD-10-CM

## 2024-06-12 ENCOUNTER — APPOINTMENT (OUTPATIENT)
Dept: ORTHOPEDIC SURGERY | Facility: CLINIC | Age: 39
End: 2024-06-12

## 2024-09-22 ENCOUNTER — NON-APPOINTMENT (OUTPATIENT)
Age: 39
End: 2024-09-22

## 2024-12-17 NOTE — DISCHARGE NOTE OB - LAUNCH MEDICATION RECONCILIATION
[Time Spent: ___ minutes] : I have spent [unfilled] minutes of time on the encounter which excludes teaching and separately reported services. <<-----Click here for Discharge Medication Review